# Patient Record
Sex: MALE | Race: WHITE | Employment: UNEMPLOYED | URBAN - METROPOLITAN AREA
[De-identification: names, ages, dates, MRNs, and addresses within clinical notes are randomized per-mention and may not be internally consistent; named-entity substitution may affect disease eponyms.]

---

## 2017-01-11 ENCOUNTER — TELEPHONE (OUTPATIENT)
Dept: FAMILY MEDICINE | Facility: OTHER | Age: 37
End: 2017-01-11

## 2017-01-11 NOTE — TELEPHONE ENCOUNTER
Summary:    Patient is due/failing the following:   ACT and BP CHECK    Action needed:   Patient needs to do ACT. and Patient needs follow up  appointment.    Type of outreach:    Phone, left message for patient to call back.     Questions for provider review:    None                                                                                                                                    Raissa Mcmanus       Chart routed to Care Team .        Panel Management Review      Patient has the following on his problem list:     Hypertension   Last three blood pressure readings:  BP Readings from Last 3 Encounters:   09/30/16 140/82   03/11/16 142/90   12/14/11 120/90     Blood pressure: Failed    HTN Guidelines:  Age 18-59 BP range:  Less than 140/90  Age 60-85 with Diabetes:  Less than 140/90  Age 60-85 without Diabetes:  less than 150/90      Composite cancer screening  Chart review shows that this patient is due/due soon for the following None

## 2017-01-11 NOTE — Clinical Note
[address]   [facility] [address]  Phone: [phone]  January 16, 2017      Davie Honeycutt  [address] [address] [place] [address] [facility] [address]      Dear Davie,    We care about your health and have reviewed your health plan including your medical conditions, medications, and lab results.  Based on this review, it is recommended that you follow up regarding the following health topic(s):  -Asthma  -High Blood Pressure    We recommend you take the following action(s):  -schedule a FOLLOWUP APPOINTMENT.   -Complete and return the attached ASTHMA CONTROL TEST.  If your total score is 19 or less or you have been to the ER or urgent care for your asthma, then please schedule an asthma followup appointment.     Please call us at the [facility] - [phone] (or use Att[employer] Live) to address the above recommendations.     Thank you for trusting [facility] and we appreciate the opportunity to serve you.  We look forward to supporting your healthcare needs in the future.    Healthy Regards,    Your Health Care Team  [facility]

## 2017-03-24 ENCOUNTER — OFFICE VISIT (OUTPATIENT)
Dept: UROLOGY | Facility: CLINIC | Age: 37
End: 2017-03-24
Payer: COMMERCIAL

## 2017-03-24 VITALS — DIASTOLIC BLOOD PRESSURE: 72 MMHG | HEART RATE: 80 BPM | SYSTOLIC BLOOD PRESSURE: 122 MMHG | RESPIRATION RATE: 14 BRPM

## 2017-03-24 DIAGNOSIS — Z30.09 STERILIZATION CONSULT: Primary | ICD-10-CM

## 2017-03-24 PROCEDURE — 99203 OFFICE O/P NEW LOW 30 MIN: CPT | Performed by: UROLOGY

## 2017-03-24 NOTE — NURSING NOTE
"Chief Complaint   Patient presents with     Consult       Initial /72 (BP Location: Right arm, Patient Position: Chair, Cuff Size: Adult Large)  Pulse 80  Resp 14 Estimated body mass index is 36.41 kg/(m^2) as calculated from the following:    Height as of 3/11/16: 1.88 m (6' 2\").    Weight as of 9/30/16: 128.6 kg (283 lb 9.6 oz).  Medication Reconciliation: complete   Susy Hoang RN       "

## 2017-03-24 NOTE — PATIENT INSTRUCTIONS
Your Vasectomy is scheduled 4/20/2017 at 1000 .  Please call [phone] if you need to reschedule this appointment or if you have any questions.     General Vasectomy Information    Vasectomy is a surgery.  If it is successful, it will be impossible for you to ever father children.  The following information regards the male sterilization done by an operation called a vasectomy.  This is done in the physician's office.    The operation done to sterilize the male is easier, safer and much less expensive than the operation done to sterilize the woman.    Sterilization should be considered permanent.  For most males, once the operation is done, it can never me undone.  There are attempts made occasionally to reconnect the tubes that have been cut during the procedure, but this is very difficult and expensive and works only about 50-70% of the time.  In order for any of the physicians in our clinic to do a vasectomy, we require that you consider this a permanent form a sterilization.    A vasectomy can be done at any time, but it is best to think about having it done when you can take at least one day off from work and any excess activities.    Your decision to have a vasectomy should only be made with the following facts clear in mind.    1. First, a vasectomy is only one of several means of birth control.  Many form of temporary contraception are available.  If you have any questions about other methods, please discuss this with your physician.    2. A vasectomy may be unsuccessful in approximately one out of 1000 couples per year.  This occurs when the tubes which are cut during the procedure reconnect themselves.  Sterility cannot be guaranteed.    3. You should be aware that it is the current belief of the medical profession that a vasectomy procedure does not alter a male physically, physiologically or sexually.  Because each person is a unique individual, there is always the possibility of an adverse  phychiatric reaction.  This can be best avoided by being very comfortable in your own mind that you want to have this done, and that you do not want to father any children in the future.  If this is not clear in your mind, this should be further discussed with your physician.    4. You will not notice a change in the volume of your ejaculate since sperm is a very small amount of the semen and it is only the sperm that is stopped from entering the ejaculate after a vasectomy.  Your prostate and seminal vesicle glands really supply most of the semen and this is not at all decreased after a vasectomy.  Also there is no effect on the male hormones.    5. You do not become sterile immediately following a vasectomy due to the fact that there is still sperm remaining in your system that must be eliminated by ejaculation.  For this reason, your sexual partner could still become pregnant for a period of time following the vasectomy operation.  It is necessary that contraceptive measures be used until you receive confirmation from your physician that you are sterile.  It takes approximately 12 ejaculations to clear the semen of sperm, but this can differ in different men.  For this reason, it is very important that your semen be checked for sperm before you are considered sterile, and this should be done approximately 12 weeks after your vasectomy.      6. Vasectomy has risks and benefits.  Among the risks are possible complications resulting from the procedure.  These risks include but are not limited to:   A.  Bleeding, infection, or hematoma occuring during or in the recovery period   from the procedure.   B.  Sperm granuloma or a small pea to walnut sized lump which is a collection of   scar tissue and sperm in your scrotal sack and remains permanently   C.  There may be an increased risk of prostate cancer although the data is   unclear.  Preparation for Vasectomy:  Shave the hair away from the base of your penis and  around your testicles.  Wear snug underwear the day of the vasectomy to support your testicles.  Do not take aspirin, ibuprofen, advil, motrin, aleve products one week prior to your vasectomy.  Arrange for a  if you take any medication for relaxation from the physician.

## 2017-03-24 NOTE — MR AVS SNAPSHOT
After Visit Summary   3/24/2017    Davie Honeycutt    MRN: [id number]           Patient Information     Date Of Birth          1980        Visit Information        Provider Department      3/24/2017 11:30 AM Mesfin Mccurdy MD [facility]     Sterilization consult    -  1      Care Instructions    Your Vasectomy is scheduled 4/20/2017 at 1000 .  Please call [phone] if you need to reschedule this appointment or if you have any questions.     General Vasectomy Information    Vasectomy is a surgery.  If it is successful, it will be impossible for you to ever father children.  The following information regards the male sterilization done by an operation called a vasectomy.  This is done in the physician's office.    The operation done to sterilize the male is easier, safer and much less expensive than the operation done to sterilize the woman.    Sterilization should be considered permanent.  For most males, once the operation is done, it can never me undone.  There are attempts made occasionally to reconnect the tubes that have been cut during the procedure, but this is very difficult and expensive and works only about 50-70% of the time.  In order for any of the physicians in our clinic to do a vasectomy, we require that you consider this a permanent form a sterilization.    A vasectomy can be done at any time, but it is best to think about having it done when you can take at least one day off from work and any excess activities.    Your decision to have a vasectomy should only be made with the following facts clear in mind.    1. First, a vasectomy is only one of several means of birth control.  Many form of temporary contraception are available.  If you have any questions about other methods, please discuss this with your physician.    2. A vasectomy may be unsuccessful in approximately one out of 1000 couples per year.  This occurs when the tubes  which are cut during the procedure reconnect themselves.  Sterility cannot be guaranteed.    3. You should be aware that it is the current belief of the medical profession that a vasectomy procedure does not alter a male physically, physiologically or sexually.  Because each person is a unique individual, there is always the possibility of an adverse phychiatric reaction.  This can be best avoided by being very comfortable in your own mind that you want to have this done, and that you do not want to father any children in the future.  If this is not clear in your mind, this should be further discussed with your physician.    4. You will not notice a change in the volume of your ejaculate since sperm is a very small amount of the semen and it is only the sperm that is stopped from entering the ejaculate after a vasectomy.  Your prostate and seminal vesicle glands really supply most of the semen and this is not at all decreased after a vasectomy.  Also there is no effect on the male hormones.    5. You do not become sterile immediately following a vasectomy due to the fact that there is still sperm remaining in your system that must be eliminated by ejaculation.  For this reason, your sexual partner could still become pregnant for a period of time following the vasectomy operation.  It is necessary that contraceptive measures be used until you receive confirmation from your physician that you are sterile.  It takes approximately 12 ejaculations to clear the semen of sperm, but this can differ in different men.  For this reason, it is very important that your semen be checked for sperm before you are considered sterile, and this should be done approximately 12 weeks after your vasectomy.      6. Vasectomy has risks and benefits.  Among the risks are possible complications resulting from the procedure.  These risks include but are not limited to:   A.  Bleeding, infection, or hematoma occuring during or in the recovery  "period   from the procedure.   B.  Sperm granuloma or a small pea to walnut sized lump which is a collection of   scar tissue and sperm in your scrotal sack and remains permanently   C.  There may be an increased risk of prostate cancer although the data is   unclear.  Preparation for Vasectomy:  Shave the hair away from the base of your penis and around your testicles.  Wear snug underwear the day of the vasectomy to support your testicles.  Do not take aspirin, ibuprofen, advil, motrin, aleve products one week prior to your vasectomy.  Arrange for a  if you take any medication for relaxation from the physician.            Follow-ups after your visit        Your next 10 appointments already scheduled     Apr 20, 2017 10:00 AM CDT   Return Visit with Mesfin Mccurdy MD   [facility]    [address] [place] [address] [phone]              Who to contact     If you have questions or need follow up information about today's clinic visit or your schedule please contact [facility] directly at [phone].  Normal or non-critical lab and imaging results will be communicated to you by Yarraahart, letter or phone within 4 business days after the clinic has received the results. If you do not hear from us within 7 days, please contact the clinic through Million-[employer] or phone. If you have a critical or abnormal lab result, we will notify you by phone as soon as possible.  Submit refill requests through [email or web address] or call your pharmacy and they will forward the refill request to us. Please allow 3 business days for your refill to be completed.          Additional Information About Your Visit        [employer] Information     [email or web address] lets you send messages to your doctor, view your test results, renew your prescriptions, schedule appointments and more. To sign up, go to [email or web address] . Click on \"Log in\" on the left side of the screen, which will " "take you to the Welcome page. Then click on \"Sign up Now\" on the right side of the page.     You will be asked to enter the access code listed below, as well as some personal information. Please follow the directions to create your username and password.     Your access code is: [email or web address]  Expires: 2017  4:10 PM     Your access code will  in 90 days. If you need help or a new code, please call your [place] clinic or [phone].        Care EveryWhere ID     This is your Care EveryWhere ID. This could be used by other organizations to access your [place] medical records  [id number]        Your Vitals Were     Pulse Respirations                80 14           Blood Pressure from Last 3 Encounters:   17 122/72   16 140/82   16 142/90    Weight from Last 3 Encounters:   16 128.6 kg (283 lb 9.6 oz)   16 125.8 kg (277 lb 6.4 oz)   11 108.9 kg (240 lb)              Today, you had the following     No orders found for display       Primary Care Provider Office Phone # Fax #    Jarod Navarrete PA-C [phone]       [address]  [facility] [address]        Thank you!     Thank you for choosing [facility]  for your care. Our goal is always to provide you with excellent care. Hearing back from our patients is one way we can continue to improve our services. Please take a few minutes to complete the written survey that you may receive in the mail after your visit with us. Thank you!             Your Updated Medication List - Protect others around you: Learn how to safely use, store and throw away your medicines at www.disposemymeds.org.      Notice  As of 3/24/2017 11:42 AM    You have not been prescribed any medications.      "

## 2017-04-17 ENCOUNTER — TELEPHONE (OUTPATIENT)
Dept: UROLOGY | Facility: CLINIC | Age: 37
End: 2017-04-17

## 2017-04-17 NOTE — TELEPHONE ENCOUNTER
Reason for Call:  Other call back    Detailed comments:  Please call to clarify if the scheduled procedure(vesectomy) will be in the office or will take place at an hospital ? Please call to discuss further    Phone Number Patient can be reached at: Cell number on file:    No relevant phone numbers on file.       Best Time: today    Can we leave a detailed message on this number? YES    Call taken on 4/17/2017 at 11:41 AM by Moira Redding

## 2017-04-20 ENCOUNTER — OFFICE VISIT (OUTPATIENT)
Dept: UROLOGY | Facility: OTHER | Age: 37
End: 2017-04-20
Payer: COMMERCIAL

## 2017-04-20 VITALS — DIASTOLIC BLOOD PRESSURE: 122 MMHG | SYSTOLIC BLOOD PRESSURE: 162 MMHG | OXYGEN SATURATION: 100 % | HEART RATE: 88 BPM

## 2017-04-20 DIAGNOSIS — I10 ESSENTIAL HYPERTENSION: ICD-10-CM

## 2017-04-20 DIAGNOSIS — Z30.2 ENCOUNTER FOR VASECTOMY: Primary | ICD-10-CM

## 2017-04-20 PROCEDURE — 55250 REMOVAL OF SPERM DUCT(S): CPT | Performed by: UROLOGY

## 2017-04-20 PROCEDURE — 88302 TISSUE EXAM BY PATHOLOGIST: CPT | Performed by: UROLOGY

## 2017-04-20 PROCEDURE — 99000 SPECIMEN HANDLING OFFICE-LAB: CPT | Performed by: UROLOGY

## 2017-04-20 NOTE — PROGRESS NOTES
Patient returns to clinic for vasectomy.  After informed consent has been obtained, he was placed supine in the OR table.  He was draped and prepped in usual surgical fashion.  2% lidocaine used for local anesthetics.  A scalpel less technique was used.  A small nick was made in the skin after vas identified/clamped.  Surrounding tissue was  from vas.  A small portion of vas was excised.  Lumen of vas burned.  Vas tied with silk sutures after proximal portion closed.  3.0 chromic suture to close skin opening.  This was again repeated on the other side.  Patient tolerated the procedure well.  Post vas instructions given to patient today.    HTN: Patient to follow up with Primary Care provider regarding elevated blood pressure.

## 2017-04-20 NOTE — MR AVS SNAPSHOT
"              After Visit Summary   4/20/2017    Davie Honeycutt    MRN: [id number]           Patient Information     Date Of Birth          1980        Visit Information        Provider Department      4/20/2017 10:00 AM Mesfin Mccurdy MD [facility]        Today's Diagnoses     Encounter for vasectomy    -  1    Essential hypertension           Follow-ups after your visit        Future tests that were ordered for you today     Open Standing Orders        Priority Remaining Interval Expires Ordered    Semen Post Vasectomy Qual ([place], [place], [place]) Routine 2/2 prn 7/20/2017 4/20/2017            Who to contact     If you have questions or need follow up information about today's clinic visit or your schedule please contact [facility] directly at [phone].  Normal or non-critical lab and imaging results will be communicated to you by MyChart, letter or phone within 4 business days after the clinic has received the results. If you do not hear from us within 7 days, please contact the clinic through MyChart or phone. If you have a critical or abnormal lab result, we will notify you by phone as soon as possible.  Submit refill requests through [employer] or call your pharmacy and they will forward the refill request to us. Please allow 3 business days for your refill to be completed.          Additional Information About Your Visit        MyChart Information     [employer] lets you send messages to your doctor, view your test results, renew your prescriptions, schedule appointments and more. To sign up, go to [email or web address] . Click on \"Log in\" on the left side of the screen, which will take you to the Welcome page. Then click on \"Sign up Now\" on the right side of the page.     You will be asked to enter the access code listed below, as well as some personal information. Please follow the directions to create your username and password.     Your access code is: " [email or web address]  Expires: 2017  4:10 PM     Your access code will  in 90 days. If you need help or a new code, please call your [place] clinic or [phone].        Care EveryWhere ID     This is your Care EveryWhere ID. This could be used by other organizations to access your [place] medical records  [id number]        Your Vitals Were     Pulse Pulse Oximetry                88 100%           Blood Pressure from Last 3 Encounters:   17 (!) 162/122   17 122/72   16 140/82    Weight from Last 3 Encounters:   16 128.6 kg (283 lb 9.6 oz)   16 125.8 kg (277 lb 6.4 oz)   11 108.9 kg (240 lb)              We Performed the Following     CL SPECIMEN HANDLING, OFF->LAB     Surgical pathology exam     VASECTOMY UNILAT/BILAT W POSTOP SEMEN        Primary Care Provider Office Phone # Fax #    Jarod Navarrete PA-C [phone]       [facility] [address]  [facility] [address]        Thank you!     Thank you for choosing [facility]  for your care. Our goal is always to provide you with excellent care. Hearing back from our patients is one way we can continue to improve our services. Please take a few minutes to complete the written survey that you may receive in the mail after your visit with us. Thank you!             Your Updated Medication List - Protect others around you: Learn how to safely use, store and throw away your medicines at www.disposemymeds.org.      Notice  As of 2017 10:48 AM    You have not been prescribed any medications.

## 2017-04-20 NOTE — NURSING NOTE
"Chief Complaint   Patient presents with     Vasectomy       Initial BP (!) 162/122 (BP Location: Left arm, Patient Position: Chair, Cuff Size: Adult Large)  Pulse 88  SpO2 100% Estimated body mass index is 36.41 kg/(m^2) as calculated from the following:    Height as of 3/11/16: 6' 2\" (1.88 m).    Weight as of 9/30/16: 283 lb 9.6 oz (128.6 kg).  Medication Reconciliation: complete     Justine Cassidy CMA (AAMA)      "

## 2017-04-24 LAB — COPATH REPORT: NORMAL

## 2017-04-27 ENCOUNTER — TELEPHONE (OUTPATIENT)
Dept: FAMILY MEDICINE | Facility: OTHER | Age: 37
End: 2017-04-27

## 2017-04-27 NOTE — LETTER
[address]  [facility] [address]  Phone: [phone]  May 1, 2017      Dvaie Honeycutt  [address] [address] [place] [address] [place]  [place] [address]      Dear Davie,    We care about your health and have reviewed your health plan including your medical conditions, medications, and lab results.  Based on this review, it is recommended that you follow up regarding the following health topic(s):  -High Blood Pressure    We recommend you take the following action(s):  -schedule a FOLLOWUP APPOINTMENT.     Please call us at the [facility] - [phone] (or use StoredIQ) to address the above recommendations.     Thank you for trusting [facility] and we appreciate the opportunity to serve you.  We look forward to supporting your healthcare needs in the future.    Healthy Regards,    Your Health Care Team  [facility]

## 2017-04-27 NOTE — TELEPHONE ENCOUNTER
Summary:    Patient is due/failing the following:   BP CHECK and FOLLOW UP    Action needed:   Patient needs office visit for hypertension follow up    Type of outreach:    Phone, left message for patient to call back.     Questions for provider review:    None                                                                                                                                    Raissa Emerynaima       Chart routed to Care Team .      Panel Management Review      Patient has the following on his problem list:     Asthma review     ACT Total Scores 3/11/2016   ACT TOTAL SCORE -   ASTHMA ER VISITS -   ASTHMA HOSPITALIZATIONS -   ACT TOTAL SCORE (Goal Greater than or Equal to 20) 22   In the past 12 months, how many times did you visit the emergency room for your asthma without being admitted to the hospital? 0   In the past 12 months, how many times were you hospitalized overnight because of your asthma? 0      1. Is Asthma diagnosis on the Problem List? Yes    2. Is Asthma listed on Health Maintenance? Yes    3. Patient is due for:  none    Hypertension   Last three blood pressure readings:  BP Readings from Last 3 Encounters:   04/20/17 (!) 162/122   03/24/17 122/72   09/30/16 140/82     Blood pressure: FAILED    HTN Guidelines:  Age 18-59 BP range:  Less than 140/90  Age 60-85 with Diabetes:  Less than 140/90  Age 60-85 without Diabetes:  less than 150/90      Composite cancer screening  Chart review shows that this patient is due/due soon for the following None

## 2017-08-09 ENCOUNTER — TELEPHONE (OUTPATIENT)
Dept: FAMILY MEDICINE | Facility: OTHER | Age: 37
End: 2017-08-09

## 2017-08-09 NOTE — TELEPHONE ENCOUNTER
Summary:    Patient is due/failing the following:   ACT, BP CHECK and FOLLOW UP    Action needed:   Patient needs office visit for follow up and BP check . and Patient needs to do ACT.    Type of outreach:    Phone, left message for patient to call back.     Questions for provider review:    None                                                                                                                                    Raissa Mcmanus       Chart routed to Care Team .    Panel Management Review      Patient has the following on his problem list:     Asthma review     ACT Total Scores 3/11/2016   ACT TOTAL SCORE -   ASTHMA ER VISITS -   ASTHMA HOSPITALIZATIONS -   ACT TOTAL SCORE (Goal Greater than or Equal to 20) 22   In the past 12 months, how many times did you visit the emergency room for your asthma without being admitted to the hospital? 0   In the past 12 months, how many times were you hospitalized overnight because of your asthma? 0      1. Is Asthma diagnosis on the Problem List? Yes    2. Is Asthma listed on Health Maintenance? Yes    3. Patient is due for:  ACT    Hypertension   Last three blood pressure readings:  BP Readings from Last 3 Encounters:   04/20/17 (!) 162/122   03/24/17 122/72   09/30/16 140/82     Blood pressure: FAILED    HTN Guidelines:  Age 18-59 BP range:  Less than 140/90  Age 60-85 with Diabetes:  Less than 140/90  Age 60-85 without Diabetes:  less than 150/90          Composite cancer screening  Chart review shows that this patient is due/due soon for the following None

## 2017-08-09 NOTE — LETTER
[address]   [facility] [address]  Phone: [phone]  August 28, 2017      Davie Honeycutt  [address] [facility] [address]      Dear Davie,    We care about your health and have reviewed your health plan including your medical conditions, medications, and lab results.  Based on this review, it is recommended that you follow up regarding the following health topic(s):  -Asthma  -High Blood Pressure    We recommend you take the following action(s):  -schedule a FOLLOWUP APPOINTMENT.   -Complete and return the attached ASTHMA CONTROL TEST.  If your total score is 19 or less or you have been to the ER or urgent care for your asthma, then please schedule an asthma followup appointment.     Please call us at the [facility] - [phone] (or use [employer]Fusion) to address the above recommendations.     Thank you for trusting [facility] and we appreciate the opportunity to serve you.  We look forward to supporting your healthcare needs in the future.    Healthy Regards,    Your Health Care Team  [facility]

## 2017-08-11 DIAGNOSIS — Z30.2 ENCOUNTER FOR VASECTOMY: ICD-10-CM

## 2017-08-11 LAB — SPERM P VAS SMN QL MICRO: NORMAL

## 2017-08-11 PROCEDURE — 89321 SEMEN ANAL SPERM DETECTION: CPT | Performed by: UROLOGY

## 2017-08-14 ENCOUNTER — TELEPHONE (OUTPATIENT)
Dept: FAMILY MEDICINE | Facility: OTHER | Age: 37
End: 2017-08-14

## 2017-08-14 NOTE — TELEPHONE ENCOUNTER
Patient called for lab results.    No Sperm Seen.  Patient verbalized understanding.  Sylvia Tellez RN

## 2020-11-28 ENCOUNTER — APPOINTMENT (OUTPATIENT)
Dept: CT IMAGING | Facility: CLINIC | Age: 40
End: 2020-11-28
Attending: PHYSICIAN ASSISTANT
Payer: COMMERCIAL

## 2020-11-28 ENCOUNTER — HOSPITAL ENCOUNTER (EMERGENCY)
Facility: CLINIC | Age: 40
Discharge: HOME | End: 2020-11-28
Attending: PHYSICIAN ASSISTANT | Admitting: PHYSICIAN ASSISTANT
Payer: COMMERCIAL

## 2020-11-28 VITALS
HEIGHT: 75 IN | BODY MASS INDEX: 37.3 KG/M2 | OXYGEN SATURATION: 98 % | WEIGHT: 300 LBS | TEMPERATURE: 98.8 F | RESPIRATION RATE: 18 BRPM | DIASTOLIC BLOOD PRESSURE: 107 MMHG | SYSTOLIC BLOOD PRESSURE: 172 MMHG | HEART RATE: 89 BPM

## 2020-11-28 DIAGNOSIS — K85.90 ACUTE PANCREATITIS: ICD-10-CM

## 2020-11-28 DIAGNOSIS — R03.0 ELEVATED BLOOD PRESSURE READING WITHOUT DIAGNOSIS OF HYPERTENSION: ICD-10-CM

## 2020-11-28 LAB
ALBUMIN SERPL-MCNC: 4 G/DL (ref 3.4–5)
ALP SERPL-CCNC: 92 U/L (ref 40–150)
ALT SERPL W P-5'-P-CCNC: 51 U/L (ref 0–70)
ANION GAP SERPL CALCULATED.3IONS-SCNC: 7 MMOL/L (ref 3–14)
AST SERPL W P-5'-P-CCNC: 18 U/L (ref 0–45)
BASOPHILS # BLD AUTO: 0 10E9/L (ref 0–0.2)
BASOPHILS NFR BLD AUTO: 0.2 %
BILIRUB DIRECT SERPL-MCNC: 0.2 MG/DL (ref 0–0.2)
BILIRUB SERPL-MCNC: 0.8 MG/DL (ref 0.2–1.3)
BUN SERPL-MCNC: 8 MG/DL (ref 7–30)
CALCIUM SERPL-MCNC: 9.5 MG/DL (ref 8.5–10.1)
CHLORIDE SERPL-SCNC: 106 MMOL/L (ref 94–109)
CO2 SERPL-SCNC: 25 MMOL/L (ref 20–32)
CREAT SERPL-MCNC: 0.82 MG/DL (ref 0.66–1.25)
DIFFERENTIAL METHOD BLD: ABNORMAL
EOSINOPHIL NFR BLD AUTO: 0.5 %
ERYTHROCYTE [DISTWIDTH] IN BLOOD BY AUTOMATED COUNT: 13.3 % (ref 10–15)
GFR SERPL CREATININE-BSD FRML MDRD: >90 ML/MIN/{1.73_M2}
GLUCOSE SERPL-MCNC: 134 MG/DL (ref 70–99)
HCT VFR BLD AUTO: 44.8 % (ref 40–53)
HGB BLD-MCNC: 14.8 G/DL (ref 13.3–17.7)
IMM GRANULOCYTES # BLD: 0.1 10E9/L (ref 0–0.4)
IMM GRANULOCYTES NFR BLD: 0.4 %
LIPASE SERPL-CCNC: 375 U/L (ref 73–393)
LYMPHOCYTES # BLD AUTO: 1 10E9/L (ref 0.8–5.3)
LYMPHOCYTES NFR BLD AUTO: 7.5 %
MCH RBC QN AUTO: 29 PG (ref 26.5–33)
MCHC RBC AUTO-ENTMCNC: 33 G/DL (ref 31.5–36.5)
MCV RBC AUTO: 88 FL (ref 78–100)
MONOCYTES # BLD AUTO: 1 10E9/L (ref 0–1.3)
MONOCYTES NFR BLD AUTO: 7.8 %
NEUTROPHILS # BLD AUTO: 10.8 10E9/L (ref 1.6–8.3)
NEUTROPHILS NFR BLD AUTO: 83.6 %
NRBC # BLD AUTO: 0 10*3/UL
NRBC BLD AUTO-RTO: 0 /100
PLATELET # BLD AUTO: 169 10E9/L (ref 150–450)
POTASSIUM SERPL-SCNC: 3.8 MMOL/L (ref 3.4–5.3)
PROT SERPL-MCNC: 8.4 G/DL (ref 6.8–8.8)
RBC # BLD AUTO: 5.1 10E12/L (ref 4.4–5.9)
SODIUM SERPL-SCNC: 138 MMOL/L (ref 133–144)
TROPONIN I SERPL-MCNC: <0.015 UG/L (ref 0–0.04)
WBC # BLD AUTO: 12.9 10E9/L (ref 4–11)

## 2020-11-28 PROCEDURE — 93005 ELECTROCARDIOGRAM TRACING: CPT | Performed by: PHYSICIAN ASSISTANT

## 2020-11-28 PROCEDURE — 84484 ASSAY OF TROPONIN QUANT: CPT | Performed by: PHYSICIAN ASSISTANT

## 2020-11-28 PROCEDURE — 250N000011 HC RX IP 250 OP 636: Performed by: PHYSICIAN ASSISTANT

## 2020-11-28 PROCEDURE — 80048 BASIC METABOLIC PNL TOTAL CA: CPT | Performed by: PHYSICIAN ASSISTANT

## 2020-11-28 PROCEDURE — 93010 ELECTROCARDIOGRAM REPORT: CPT | Performed by: PHYSICIAN ASSISTANT

## 2020-11-28 PROCEDURE — 83690 ASSAY OF LIPASE: CPT | Performed by: PHYSICIAN ASSISTANT

## 2020-11-28 PROCEDURE — 85025 COMPLETE CBC W/AUTO DIFF WBC: CPT | Performed by: PHYSICIAN ASSISTANT

## 2020-11-28 PROCEDURE — 80076 HEPATIC FUNCTION PANEL: CPT | Performed by: PHYSICIAN ASSISTANT

## 2020-11-28 PROCEDURE — 74177 CT ABD & PELVIS W/CONTRAST: CPT

## 2020-11-28 PROCEDURE — 250N000009 HC RX 250: Performed by: PHYSICIAN ASSISTANT

## 2020-11-28 PROCEDURE — 72131 CT LUMBAR SPINE W/O DYE: CPT

## 2020-11-28 PROCEDURE — 99285 EMERGENCY DEPT VISIT HI MDM: CPT | Mod: 25 | Performed by: PHYSICIAN ASSISTANT

## 2020-11-28 PROCEDURE — 96374 THER/PROPH/DIAG INJ IV PUSH: CPT | Mod: 59 | Performed by: PHYSICIAN ASSISTANT

## 2020-11-28 RX ORDER — IOPAMIDOL 755 MG/ML
500 INJECTION, SOLUTION INTRAVASCULAR ONCE
Status: COMPLETED | OUTPATIENT
Start: 2020-11-28 | End: 2020-11-28

## 2020-11-28 RX ORDER — ONDANSETRON 4 MG/1
4 TABLET, ORALLY DISINTEGRATING ORAL EVERY 8 HOURS PRN
Qty: 10 TABLET | Refills: 0 | Status: SHIPPED | OUTPATIENT
Start: 2020-11-28 | End: 2020-11-30

## 2020-11-28 RX ORDER — KETOROLAC TROMETHAMINE 30 MG/ML
30 INJECTION, SOLUTION INTRAMUSCULAR; INTRAVENOUS ONCE
Status: COMPLETED | OUTPATIENT
Start: 2020-11-28 | End: 2020-11-28

## 2020-11-28 RX ORDER — OXYCODONE AND ACETAMINOPHEN 5; 325 MG/1; MG/1
1-2 TABLET ORAL EVERY 6 HOURS PRN
Qty: 12 TABLET | Refills: 0 | Status: SHIPPED | OUTPATIENT
Start: 2020-11-28 | End: 2020-11-30

## 2020-11-28 RX ADMIN — SODIUM CHLORIDE 80 ML: 9 INJECTION, SOLUTION INTRAVENOUS at 13:59

## 2020-11-28 RX ADMIN — IOPAMIDOL 95 ML: 755 INJECTION, SOLUTION INTRAVENOUS at 13:59

## 2020-11-28 RX ADMIN — KETOROLAC TROMETHAMINE 30 MG: 30 INJECTION, SOLUTION INTRAMUSCULAR at 14:17

## 2020-11-28 ASSESSMENT — MIFFLIN-ST. JEOR: SCORE: 2356.42

## 2020-11-28 NOTE — ED PROVIDER NOTES
"  History     Chief Complaint   Patient presents with     Back Pain     HPI  Davie Honeycutt is a 40 year old male who presents to the emergency department complaining of back pain. The patient reports yesterday he developed a burning type pain in the center of his mid back.  Pain has been constant since onset and kept him awake last night.  He has been taking Tylenol and ibuprofen for the pain without relief, most recently Tylenol at 5 AM this morning.  He also kept a heating pad on it overnight without change.  Pain radiated around both sides of his back to his abdomen at one point.  He states he has had a pulling type back pain in the past that went away within a few days but nothing like this.  Denies movement making pain worse. He denies any abdominal pain but feels like there is \"pressure\" in his abdomen.  He had diarrhea last night but no BM this morning.  He states he vomited this morning while brushing his teeth but admits he has easy gag reflex with brushing teeth.  No persistent nausea or vomiting.  He denies any chest pain, tightness, shortness of breath, lightheadedness, diaphoresis.  Denies headache, blurred vision.  No numbness, pain, or weakness in legs.  No saddle paresthesias or loss of bowel/bladder control.  No fevers, body aches, cough symptoms.  He did go to the urgent care today for his symptoms and was referred to the ER due to his blood pressure and having a fever of \"99.3 F\" by ear thermometer.  Denies any burning with urination, blood in the urine, or urinary urgency.  He has been drinking a lot more water so has been urinating more than usual.      Allergies:  No Known Allergies    Problem List:    Patient Active Problem List    Diagnosis Date Noted     HTN (hypertension) 08/26/2011     Priority: Medium     Intermittent asthma 08/26/2011     Priority: Medium     Obesity 08/26/2011     Priority: Medium     CARDIOVASCULAR SCREENING; LDL GOAL LESS THAN 160 10/31/2010     Priority: Medium " "       Past Medical History:    Past Medical History:   Diagnosis Date     Hypertension        Past Surgical History:    No past surgical history on file.    Family History:    Family History   Problem Relation Age of Onset     Cerebrovascular Disease Maternal Grandfather      Genitourinary Problems Mother      Cancer Father         skin     C.A.D. Paternal Uncle      Hypertension Paternal Uncle      Hypertension Maternal Aunt      Emphysema Maternal Grandmother      Coronary Artery Disease Paternal Grandmother 60        triple bypass,  at 84     Coronary Artery Disease Paternal Grandfather 50        had triple bypass,  at 82       Social History:  Marital Status:   [2]  Social History     Tobacco Use     Smoking status: Former Smoker     Smokeless tobacco: Current User     Types: Chew   Substance Use Topics     Alcohol use: Yes     Comment: weekends/social, 6-8 beers per night on weekends     Drug use: No        Medications:         ondansetron (ZOFRAN ODT) 4 MG ODT tab       oxyCODONE-acetaminophen (PERCOCET) 5-325 MG tablet          Review of Systems   All other systems reviewed and are negative.      Physical Exam   BP: (!) 207/119  Pulse: 91  Temp: 98.8  F (37.1  C)  Resp: 19  Height: 190.5 cm (6' 3\")  Weight: 136.1 kg (300 lb)  SpO2: 98 %      Physical Exam  Vitals signs and nursing note reviewed.   Constitutional:       General: He is not in acute distress.     Appearance: Normal appearance. He is obese. He is not ill-appearing, toxic-appearing or diaphoretic.   HENT:      Head: Normocephalic and atraumatic.      Nose: Nose normal.   Eyes:      Extraocular Movements: Extraocular movements intact.      Conjunctiva/sclera: Conjunctivae normal.      Pupils: Pupils are equal, round, and reactive to light.   Neck:      Musculoskeletal: Neck supple. No muscular tenderness.   Cardiovascular:      Rate and Rhythm: Normal rate and regular rhythm.      Pulses: Normal pulses.      Heart sounds: " Normal heart sounds.   Pulmonary:      Effort: Pulmonary effort is normal. No respiratory distress.      Breath sounds: Normal breath sounds.   Abdominal:      General: There is distension (mild).      Tenderness: There is no abdominal tenderness. There is no right CVA tenderness or left CVA tenderness.   Musculoskeletal:      Cervical back: Normal.      Thoracic back: He exhibits pain (burning pain localized to area noted but no tenderness elicited with palpation, nover overlying rash). He exhibits normal range of motion, no tenderness and no bony tenderness.      Lumbar back: Normal. He exhibits normal range of motion, no tenderness and no bony tenderness.        Back:       Comments: BLE with no edema, distal pulses palpable   Skin:     General: Skin is warm and dry.   Neurological:      General: No focal deficit present.      Mental Status: He is alert and oriented to person, place, and time. Mental status is at baseline.   Psychiatric:         Mood and Affect: Mood normal.         Behavior: Behavior normal.         ED Course        Procedures               EKG Interpretation:      Interpreted by Pallavi Hinton PA-C  Time reviewed: 1325  Symptoms at time of EKG: back pain   Rhythm: normal sinus   Rate: Normal  Axis: Normal  Ectopy: premature atrial contraction  Conduction: normal  ST Segments/ T Waves: No acute ischemic changes  Q Waves: none  Comparison to prior: frequent PACs, otherwise no acute changes    Clinical Impression: sinus rhythm, no acute ischemia, PACs        Results for orders placed or performed during the hospital encounter of 11/28/20 (from the past 24 hour(s))   CBC with platelets differential   Result Value Ref Range    WBC 12.9 (H) 4.0 - 11.0 10e9/L    RBC Count 5.10 4.4 - 5.9 10e12/L    Hemoglobin 14.8 13.3 - 17.7 g/dL    Hematocrit 44.8 40.0 - 53.0 %    MCV 88 78 - 100 fl    MCH 29.0 26.5 - 33.0 pg    MCHC 33.0 31.5 - 36.5 g/dL    RDW 13.3 10.0 - 15.0 %    Platelet Count 169 150 - 450  10e9/L    Diff Method Automated Method     % Neutrophils 83.6 %    % Lymphocytes 7.5 %    % Monocytes 7.8 %    % Eosinophils 0.5 %    % Basophils 0.2 %    % Immature Granulocytes 0.4 %    Nucleated RBCs 0 0 /100    Absolute Neutrophil 10.8 (H) 1.6 - 8.3 10e9/L    Absolute Lymphocytes 1.0 0.8 - 5.3 10e9/L    Absolute Monocytes 1.0 0.0 - 1.3 10e9/L    Absolute Basophils 0.0 0.0 - 0.2 10e9/L    Abs Immature Granulocytes 0.1 0 - 0.4 10e9/L    Absolute Nucleated RBC 0.0    Basic metabolic panel   Result Value Ref Range    Sodium 138 133 - 144 mmol/L    Potassium 3.8 3.4 - 5.3 mmol/L    Chloride 106 94 - 109 mmol/L    Carbon Dioxide 25 20 - 32 mmol/L    Anion Gap 7 3 - 14 mmol/L    Glucose 134 (H) 70 - 99 mg/dL    Urea Nitrogen 8 7 - 30 mg/dL    Creatinine 0.82 0.66 - 1.25 mg/dL    GFR Estimate >90 >60 mL/min/[1.73_m2]    GFR Estimate If Black >90 >60 mL/min/[1.73_m2]    Calcium 9.5 8.5 - 10.1 mg/dL   Troponin I   Result Value Ref Range    Troponin I ES <0.015 0.000 - 0.045 ug/L   Hepatic panel   Result Value Ref Range    Bilirubin Direct 0.2 0.0 - 0.2 mg/dL    Bilirubin Total 0.8 0.2 - 1.3 mg/dL    Albumin 4.0 3.4 - 5.0 g/dL    Protein Total 8.4 6.8 - 8.8 g/dL    Alkaline Phosphatase 92 40 - 150 U/L    ALT 51 0 - 70 U/L    AST 18 0 - 45 U/L   Lipase   Result Value Ref Range    Lipase 375 73 - 393 U/L   Lumbar spine CT w/o contrast    Narrative    CT LUMBAR SPINE WITHOUT CONTRAST  11/28/2020 2:15 PM     HISTORY: upper lumbar/lower thoracic back pain     TECHNIQUE: Axial images of the lumbar spine were obtained without  intravenous contrast. Multiplanar reformations were performed.   Radiation dose for this scan was reduced using automated exposure  control, adjustment of the mA and/or kV according to patient size, or  iterative reconstruction technique.    COMPARISON: None.    FINDINGS:    No evidence of acute fracture or posttraumatic subluxation. Multilevel  mild disc height loss is present most conspicuous at L5-S1.  No  high-grade spinal canal stenosis. Mild spinal canal stenosis is  present at L4-5 related to disc bulge. Mild foraminal stenosis is  present bilaterally and L5-S1. Paraspinal soft tissues are  unremarkable. Mild degenerative changes present bilateral sacroiliac  joints.      Impression    IMPRESSION:    1. No acute osseous abnormality.  2. Mild degenerative change.       HEATHER WASHBURN MD   CT Aortic Survey w Contrast    Narrative    CT AORTIC SURVEY WITH CONTRAST November 28, 2020 2:19 PM     HISTORY: Lower thoracic burning back pain, elevated blood pressure,  concern for possible dissection. Evaluate T/L spine as well.    TECHNIQUE: Initial noncontrast images are performed through the chest.  Follow-up thin section axial images are performed with additional  coronal and sagittal reformatted images. 95 mL of Isovue 370 are given  intravenously. Radiation dose for this scan was reduced using  automated exposure control, adjustment of the mA and/or kV according  to patient size, or iterative reconstruction technique.    FINDINGS:     Chest: Right lower lobe calcified granuloma is present. Lungs are  otherwise clear. No infiltrate or consolidation. No pleural effusion.  Heart is normal in size. Esophagus is unremarkable. No enlarged lymph  nodes. Calcified right hilar lymph node is consistent with old  granulomatous disease. Thyroid gland appears normal in size.    Aorta: Initial noncontrast images show no evidence of thoracic aortic  intramural hematoma. No mediastinal hemorrhage. Thoracic and abdominal  aorta are normal. No significant plaque or calcification. Branch  vessels are patent.    Abdomen: Inflammation noted around the pancreatic head consistent with  acute pancreatitis in the correct clinical setting. Probable fatty  infiltration of the liver. Liver, spleen, gallbladder, adrenal glands  and kidneys are otherwise unremarkable. No hydronephrosis. No enlarged  lymph nodes. No bowel obstruction,  diverticulitis or appendicitis.  Small peripancreatic lymph nodes are present. None are enlarged by CT  criteria.    Pelvis: The bladder, prostate and rectum are unremarkable. No enlarged  pelvic lymph nodes or free fluid. Bone window examination is  unremarkable. No destructive bone lesions. Please see concurrently  performed lumbar spine CT report for further details.      Impression    IMPRESSION:  1. Acute pancreatitis. No associated peripancreatic fluid collections.  No calcified gallstones are evident. Correlate with patient's  pancreatic enzymes.  2. Fatty infiltration of the liver. Abdominal and pelvic organs are  otherwise within normal limits.  3. Evidence of old granulomatous disease. Lungs are otherwise clear.  No enlarged lymph nodes.       JACK BOONE MD       Medications   ketorolac (TORADOL) injection 30 mg (30 mg Intravenous Given 11/28/20 1417)   sodium chloride (PF) 0.9% PF flush 3 mL (3 mLs Intravenous Given 11/28/20 1401)   Saline Bag 100mL  CT  flush use only (80 mLs Intravenous Given 11/28/20 1359)   iopamidol (ISOVUE-370) solution 500 mL (95 mLs Intravenous Given 11/28/20 1359)          Assessments & Plan (with Medical Decision Making)  Davie Honeycutt is a 40 year old who presented to the ED with a 24-hour history of mid back pain described as a burning sensation.  Radiated to bilateral aspects of abdomen yesterday and had one episode of vomiting today but denies fevers or persistent nausea/vomiting or abdominal pain otherwise.  No warning neurological symptoms in lower extremities.  His blood pressure on arrival was quite elevated at 207/119.  He was afebrile however with otherwise normal vital signs.  No acute abnormalities found on exam, he had no tenderness to the back or abdomen.  He had no warning neurological symptoms to suggest blood pressure affecting heart or brain however we did obtain an EKG to evaluate for effects of blood pressure.  EKG showed frequent PACs but otherwise was  normal.  His troponin was also undetectable.  Other lab studies demonstrated white count of 12.9 but otherwise unremarkable.  One concern was potential aortic dissection given blood pressure and vague back pain complaint.  CT scan aortic survey was obtained and was negative for dissection however did show findings consistent with acute pancreatitis.  Curiously his lipase and LFTs were normal but clinically pancreatitis would fit his symptomology.  Lumbar CT was negative.  He was given Toradol here with improvement of pain.  I discussed these results with the patient.  Since he is not having active nausea or vomiting and symptoms are improved I think this case of pancreatitis can be managed in the outpatient setting.  He will be prescribed Percocet and Zofran and advised clear liquid diet for the next 1 to 2 days and advance slowly as tolerated.  In regard to his blood pressure, it did improve slightly during course of ED stay but he will require close follow-up for recheck and likely starting antihypertensive medications.  He admits that he should be on blood pressure medications but has never actually started any.  He was advised to contact the clinic to schedule an appointment for Monday or Tuesday for reassessment regarding his hypertension and pancreatitis.  He was given instructions on when to return to the ED.  All questions answered and patient discharged home in suitable condition.     I have reviewed the nursing notes.    I have reviewed the findings, diagnosis, plan and need for follow up with the patient.    New Prescriptions    ONDANSETRON (ZOFRAN ODT) 4 MG ODT TAB    Take 1 tablet (4 mg) by mouth every 8 hours as needed    OXYCODONE-ACETAMINOPHEN (PERCOCET) 5-325 MG TABLET    Take 1-2 tablets by mouth every 6 hours as needed for pain       Final diagnoses:   Acute pancreatitis   Elevated blood pressure reading without diagnosis of hypertension     Note: Chart documentation done in part with Dragon Voice  Recognition software. Although reviewed after completion, some word and grammatical errors may remain.     11/28/2020   [facility] EMERGENCY DEPT     Pallavi Hinton PA-C  11/28/20 3536

## 2020-11-28 NOTE — ED AVS SNAPSHOT
[facility]  [address] [place] [address]  Phone: [phone]  Fax: [phone]                                    Davie Honeycutt   MRN: [id number]    Department: [facility]   Date of Visit: 11/28/2020           After Visit Summary Signature Page    I have received my discharge instructions, and my questions have been answered. I have discussed any challenges I see with this plan with the nurse or doctor.    ..........................................................................................................................................  Patient/Patient Representative Signature      ..........................................................................................................................................  Patient Representative Print Name and Relationship to Patient    ..................................................               ................................................  Date                                   Time    ..........................................................................................................................................  Reviewed by Signature/Title    ...................................................              ..............................................  Date                                               Time          22EPIC Rev 08/18

## 2020-11-28 NOTE — DISCHARGE INSTRUCTIONS
Your EKG and blood work was all reassuring.  Your CT scan showed that you have acute pancreatitis.  Your enzymes were normal so appears to be of mild case.  Please stick to a clear liquid diet for the next 1 to 2 days.  Use the Percocet prescribed to control pain and the Zofran prescribed for nausea.  Follow-up Monday or Tuesday for reassessment in regard to your pancreatitis and elevated blood pressure here in the emergency department.  If you develop any worsening symptoms please return to the emergency department.    Thank you for choosing [facility]'s Emergency Department. It was a pleasure taking care of you today. If you have any questions, please call [phone].    Pallavi Hinton PA-C

## 2020-11-28 NOTE — ED TRIAGE NOTES
Presented to urgent care with pain to the middle of his back that feels like it is burning.  But was noted to have high blood pressure and low grade fever so was sent to the ED.  Last night pain radiated around bilateral flank area.  Did take tylenol and advil with no relief.

## 2020-11-30 ENCOUNTER — OFFICE VISIT (OUTPATIENT)
Dept: FAMILY MEDICINE | Facility: OTHER | Age: 40
End: 2020-11-30
Payer: COMMERCIAL

## 2020-11-30 VITALS
SYSTOLIC BLOOD PRESSURE: 158 MMHG | BODY MASS INDEX: 39.17 KG/M2 | RESPIRATION RATE: 16 BRPM | HEIGHT: 75 IN | OXYGEN SATURATION: 97 % | TEMPERATURE: 97.9 F | DIASTOLIC BLOOD PRESSURE: 102 MMHG | WEIGHT: 315 LBS | HEART RATE: 81 BPM

## 2020-11-30 DIAGNOSIS — E66.01 MORBID OBESITY (H): ICD-10-CM

## 2020-11-30 DIAGNOSIS — R06.83 SNORING: ICD-10-CM

## 2020-11-30 DIAGNOSIS — I10 BENIGN ESSENTIAL HYPERTENSION: ICD-10-CM

## 2020-11-30 DIAGNOSIS — K85.90 ACUTE PANCREATITIS, UNSPECIFIED COMPLICATION STATUS, UNSPECIFIED PANCREATITIS TYPE: Primary | ICD-10-CM

## 2020-11-30 LAB
CHOLEST SERPL-MCNC: 176 MG/DL
HDLC SERPL-MCNC: 60 MG/DL
LDLC SERPL CALC-MCNC: 94 MG/DL
NONHDLC SERPL-MCNC: 116 MG/DL
TRIGL SERPL-MCNC: 108 MG/DL
TSH SERPL DL<=0.005 MIU/L-ACNC: 0.91 MU/L (ref 0.4–4)

## 2020-11-30 PROCEDURE — 80061 LIPID PANEL: CPT | Performed by: FAMILY MEDICINE

## 2020-11-30 PROCEDURE — 84443 ASSAY THYROID STIM HORMONE: CPT | Performed by: FAMILY MEDICINE

## 2020-11-30 PROCEDURE — 36415 COLL VENOUS BLD VENIPUNCTURE: CPT | Performed by: FAMILY MEDICINE

## 2020-11-30 PROCEDURE — 99214 OFFICE O/P EST MOD 30 MIN: CPT | Performed by: FAMILY MEDICINE

## 2020-11-30 RX ORDER — IBUPROFEN 200 MG
TABLET ORAL
COMMUNITY
End: 2023-09-29

## 2020-11-30 RX ORDER — HYDROCHLOROTHIAZIDE 12.5 MG/1
12.5 TABLET ORAL DAILY
Qty: 30 TABLET | Refills: 1 | Status: SHIPPED | OUTPATIENT
Start: 2020-11-30 | End: 2020-12-22

## 2020-11-30 ASSESSMENT — MIFFLIN-ST. JEOR: SCORE: 2440.34

## 2020-11-30 NOTE — PROGRESS NOTES
"Subjective     Davie Honeycutt is a 40 year old male who presents to clinic today for the following health issues:    History of Present Illness       Hypertension: He presents for follow up of hypertension.  He does not check blood pressure  regularly outside of the clinic. Outside blood pressures have been over 140/90. He does not follow a low salt diet.     He eats 0-1 servings of fruits and vegetables daily.He consumes 1 sweetened beverage(s) daily.He exercises with enough effort to increase his heart rate 10 to 19 minutes per day.    He is taking medications regularly.           ED/UC Followup:    Facility:  [facility] ED  Date of visit: 11/28/2020  Reason for visit: Acute pancreatitis   Current Status: 0/10 on pain scale. \" I feel a lot better.\"      Review of Systems   Constitutional, HEENT, cardiovascular, pulmonary, GI, , musculoskeletal, neuro, skin, endocrine and psych systems are negative, except as otherwise noted.       Objective    BP (!) 158/102   Pulse 81   Temp 97.9  F (36.6  C) (Temporal)   Resp 16   Ht 1.905 m (6' 3\")   Wt 144.5 kg (318 lb 8 oz)   SpO2 97%   BMI 39.81 kg/m    Body mass index is 39.81 kg/m .  Physical Exam   GENERAL: healthy, alert and no distress  NECK: no adenopathy, no asymmetry, masses, or scars and thyroid normal to palpation  RESP: lungs clear to auscultation - no rales, rhonchi or wheezes  CV: regular rate and rhythm, normal S1 S2, no S3 or S4, no murmur, click or rub, no peripheral edema and peripheral pulses strong  ABDOMEN: soft, nontender, no hepatosplenomegaly, no masses and bowel sounds normal  MS: no gross musculoskeletal defects noted, no edema    1. Morbid obesity (H)  Check thyroid . Advised weight loss  - TSH with free T4 reflex    2. Benign essential hypertension  Persistently elevated blood pressures. Start hydrochlorothiazide . Recheck in 1 month for follow up. Advised low salt diet and regular exercise  - Lipid panel reflex to direct LDL Fasting  - " hydrochlorothiazide (HYDRODIURIL) 12.5 MG tablet; Take 1 tablet (12.5 mg) by mouth daily  Dispense: 30 tablet; Refill: 1    3. Snoring  R/o sleep apnea as possible trigger for hypertension. Referral placed.  - SLEEP EVALUATION & MANAGEMENT REFERRAL - [facility] [place]  [phone] (Age 15 and up); Future    4. Acute pancreatitis, unspecified complication status, unspecified pancreatitis type   Improved pain. Has not needed pain medications since this morning. Passing gas. Discussed advancing diet . Advised to strictly avoid alcohol as it could have been the trigger.  Discussed home care  Reportable signs and symptoms discussed  RTC if symptoms persist or fail to improve

## 2020-11-30 NOTE — PROGRESS NOTES
"Subjective     Davie Honeycutt is a 40 year old male who presents to clinic today for the following health issues:    History of Present Illness       Hypertension: He presents for follow up of hypertension.  He does not check blood pressure  regularly outside of the clinic. Outside blood pressures have been over 140/90. He does not follow a low salt diet.     He eats 0-1 servings of fruits and vegetables daily.He consumes 1 sweetened beverage(s) daily.He exercises with enough effort to increase his heart rate 10 to 19 minutes per day.    He is taking medications regularly.             Hospital Follow-up Visit:    Hospital/Nursing Home/IP Rehab Facility: [facility]  Date of Admission: 11/28/2020  Date of Discharge: 11/28/2020  Reason(s) for Admission: Pancreatitis      Was your hospitalization related to COVID-19? No   Problems taking medications regularly:  None  Medication changes since discharge: None  Problems adhering to non-medication therapy:  None    Summary of hospitalization:  {HOSPITAL DISCHARGE SUMMARY INFO:656880::\"[facility] discharge summary reviewed\"}  Diagnostic Tests/Treatments reviewed.  Follow up needed: {NONE DEFAULTED:385405::\"none\"}  Other Healthcare Providers Involved in Patient s Care:         {those currently involved after discharge:733128::\"None\"}  Update since discharge: {IMPROVED DEFAULT:709705::\"improved.\"} {TIP  Include information from family/caregivers, SNF, Care Coordination :139597}      Post Discharge Medication Reconciliation: {ACO Med Rec (Provider):332922}.  Plan of care communicated with {Communicate Plan to:002723::\"patient\"}     {Reference  Coding guidelines- Moderate Complexity F2F/Video within 7 - 14 days of discharge 69979, High Complexity F2F/Video within 7 days 59686 or [id number] days 87377 :048861}         {additonal problems for provider to add (Optional):111488}    Review of Systems   {ROS COMP (Optional):967348}      Objective    There " were no vitals taken for this visit.  There is no height or weight on file to calculate BMI.  Physical Exam   {Exam List (Optional):050576}    {Diagnostic Test Results (Optional):542478}        {PROVIDER CHARTING PREFERENCE:792208}

## 2020-12-13 NOTE — PROGRESS NOTES
Vasectomy Consult    Reason for visit:   Discuss as a method of birth control/sterilization.  He is interested in vasectomy scrotally.  Patient has 2 children and desires sterilization.  Does not want to use condom or having partners on birth control pills.  He has no erections problem.  He has no urinary complaints.    No current outpatient prescriptions on file.     No Known Allergies  Past Medical History:   Diagnosis Date     Hypertension      No past surgical history on file.   Family History   Problem Relation Age of Onset     CEREBROVASCULAR DISEASE Maternal Grandfather      Genitourinary Problems Mother      CANCER Father      skin     C.A.D. Paternal Uncle      Hypertension Paternal Uncle      Hypertension Maternal Aunt      Emphysema Maternal Grandmother      Coronary Artery Disease Paternal Grandmother 60     triple bypass,  at 84     Coronary Artery Disease Paternal Grandfather 50     had triple bypass,  at 82     Social History     Social History     Marital status:      Spouse name: N/A     Number of children: N/A     Years of education: N/A     Social History Main Topics     Smoking status: Former Smoker     Smokeless tobacco: Current User     Types: Chew     Alcohol use Yes      Comment: weekends/social, 6-8 beers per night on weekends     Drug use: No     Sexual activity: Yes     Partners: Female     Birth control/ protection: Pill     Other Topics Concern     Parent/Sibling W/ Cabg, Mi Or Angioplasty Before 65f 55m? No     Social History Narrative       REVIEW OF SYSTEMS  =================  C: NEGATIVE for fever, chills, change in weight  I: NEGATIVE for worrisome rashes, moles or lesions  E/M: NEGATIVE for ear, mouth and throat problems  R: NEGATIVE for significant cough or SHORTNESS OF BREATH  CV:  NEGATIVE for chest pain, palpitations or peripheral edema  GI: NEGATIVE for nausea, abdominal pain, heartburn, or change in bowel habits  NEURO: NEGATIVE numbness/weakness  :  see HPI  PSYCH: NEGATIVE depression/anxiety  LYmph: no new enlarged lymph nodes  Ortho: no new trauma/movements      Physical Exam:  /72 (BP Location: Right arm, Patient Position: Chair, Cuff Size: Adult Large)  Pulse 80  Resp 14   Patient is pleasant, in no acute distress, good general condition.  HEENT:  Normalcephalic, atraumatic  Lung: no evidence of respiratory distress    Abdomen: Soft, nondistended, non tender. No masses. No rebound or guarding.   Exam: penis no d/c testis no masses bilateral vas palpated no scrotal lesion  Skin: Warm and dry.  No redness.  Neuro: grossly normal  Psych normal mood and affect  Musculoskeletal  moving all extremities        Discussed    That vasectomy is permanent method of birth control.    That vasectomy can fail due to recanalization of the vas even many months/years later.    That he needs 2 negative sperm checks to be considered sterile    That there are other methods that are not permanent and also that the sperm can be frozen for later use.    How the technique is performed, risks of infection, bleeding, damage to the testes vessels and testes atrophy    Long term complication such as chronic and difficult to treat testes pain and questionable increase incidence of prostate cancer    That the procedure can be done at the clinic or hospital OR        Plan:    Stop Aspirin  Will schedule Vasectomy in the future         finger

## 2020-12-16 NOTE — PROGRESS NOTES
"Davie Honeycutt is a 40 year old male who is being evaluated via a billable telephone visit.      Davie Honeycutt is a 40 year old male who is being evaluated via a billable video visit.      The patient has been notified of following:     \"This video visit will be conducted via a call between you and your physician/provider. We have found that certain health care needs can be provided without the need for an in-person physical exam.  This service lets us provide the care you need with a video conversation.  If a prescription is necessary we can send it directly to your pharmacy.  If lab work is needed we can place an order for that and you can then stop by our lab to have the test done at a later time.    Video visits are billed at different rates depending on your insurance coverage.  Please reach out to your insurance provider with any questions.    If during the course of the call the physician/provider feels a video visit is not appropriate, you will not be charged for this service.\"    Patient has given verbal consent for Video visit? Yes  How would you like to obtain your AVS? Mail a copy  If you are dropped from the video visit, the video invite should be resent to: Text to cell phone: [phone]  Will anyone else be joining your video visit? No        Video-Visit Details    Type of service:  Video Visit    Video Start Time: 14:40  Video End Time: 15:03    Originating Location (pt. Location): Home    Distant Location (provider location):  [facility]     Platform used for Video Visit: Doximity                Does Davie have a CPAP/Bipap?  No     [place] Sleep Scale: 12          Sleep Consultation:    Date on this visit: 12/17/2020    Davie Honeycutt is sent by Gloria Kumar for a sleep consultation regarding snoring.    Primary Physician: Jarod Navarrete     Davie Honeycutt reports nightly snoring and apneas for 14 years.     Davie goes to sleep at 10:30 PM during the " week. He wakes up at 7:00 AM without an alarm. He falls asleep in 20 minutes.  Davie denies difficulty falling asleep.  He wakes up 4 to 5 times a night for 20 minutes before falling back to sleep.  Davie wakes up to go to the bathroom and uncertain reasons.  On weekends, Davie keeps similar schedule.  Patient gets an average of 6 to 8 hours of sleep per night.     Patient does not use electronics in bed, watch TV in bed and read in bed.     Davie  doesn't shift work.  He works day shifts.      Davie does snore every night. Patient does have a regular bed partner. There is report of snoring.  He does have witnessed apneas. They occasionally sleep separately.  Patient sleeps on his back and side. He denies occasional restless legs. Davie denies any bruxism, sleep walking, sleep talking, dream enactment, sleep paralysis, cataplexy and hypnogogic/hypnopompic hallucinations.    He denies sleep walking, sleep talking, enuresis and sleep terrors as a child.  Davie denies difficulty breathing through his nose, claustrophobia, reflux at night, heartburn and depression.      Davie has gained 0-5 pounds in the last year.   Patient's [place] Sleepiness score 12/24 consistent with moderate daytime sleepiness.      Davie naps 1-2 times per day for 20-30 minutes, feels refreshed after naps. He takes no inadvertant naps.  He admits dozing while driving only on long trips.  Patient was counseled on the importance of driving while alert, to pull over if drowsy, or nap before getting into the vehicle if sleepy.  He uses 1 to 2 cups/day of coffee. Last caffeine intake is usually before noon.    Allergies:    No Known Allergies    Medications:    Current Outpatient Medications   Medication Sig Dispense Refill     hydrochlorothiazide (HYDRODIURIL) 12.5 MG tablet Take 1 tablet (12.5 mg) by mouth daily 30 tablet 1     ibuprofen (ADVIL/MOTRIN) 200 MG tablet          Problem List:  Patient Active Problem List    Diagnosis Date Noted      Morbid obesity (H) 11/30/2020     Priority: Medium     HTN (hypertension) 08/26/2011     Priority: Medium     Intermittent asthma 08/26/2011     Priority: Medium        Past Medical/Surgical History:  Past Medical History:   Diagnosis Date     Hypertension      No past surgical history on file.    Social History:  Social History     Socioeconomic History     Marital status:      Spouse name: Not on file     Number of children: Not on file     Years of education: Not on file     Highest education level: Not on file   Occupational History     Not on file   Social Needs     Financial resource strain: Not on file     Food insecurity     Worry: Not on file     Inability: Not on file     Transportation needs     Medical: Not on file     Non-medical: Not on file   Tobacco Use     Smoking status: Former Smoker     Smokeless tobacco: Current User     Types: Chew   Substance and Sexual Activity     Alcohol use: Yes     Comment: weekends/social, 6-8 beers per night on weekends     Drug use: No     Sexual activity: Yes     Partners: Female     Birth control/protection: Pill   Lifestyle     Physical activity     Days per week: Not on file     Minutes per session: Not on file     Stress: Not on file   Relationships     Social connections     Talks on phone: Not on file     Gets together: Not on file     Attends Confucianism service: Not on file     Active member of club or organization: Not on file     Attends meetings of clubs or organizations: Not on file     Relationship status: Not on file     Intimate partner violence     Fear of current or ex partner: Not on file     Emotionally abused: Not on file     Physically abused: Not on file     Forced sexual activity: Not on file   Other Topics Concern     Parent/sibling w/ CABG, MI or angioplasty before 65F 55M? No   Social History Narrative     Not on file       Family History:  Family History   Problem Relation Age of Onset     Cerebrovascular Disease Maternal Grandfather       Genitourinary Problems Mother      Cancer Father         skin     Emphysema Maternal Grandmother      Coronary Artery Disease Paternal Grandmother 60        triple bypass,  at 84     Coronary Artery Disease Paternal Grandfather 50        had triple bypass,  at 82     C.A.D. Paternal Uncle      Hypertension Paternal Uncle      Hypertension Maternal Aunt        Review of Systems:  A complete review of systems reviewed by me is negative with the exeption of what has been mentioned in the history of present illness.  CONSTITUTIONAL: NEGATIVE for weight gain/loss, fever, chills, sweats or night sweats, drug allergies.  EYES: NEGATIVE for changes in vision, blind spots, double vision.  ENT: NEGATIVE for ear pain, sore throat, sinus pain, post-nasal drip, runny nose, bloody nose  CARDIAC: NEGATIVE for fast heartbeats or fluttering in chest, chest pain or pressure, breathlessness when lying flat, swollen legs or swollen feet.  NEUROLOGIC: NEGATIVE headaches, weakness or numbness in the arms or legs.  DERMATOLOGIC: NEGATIVE for rashes, new moles or change in mole(s)  PULMONARY: NEGATIVE SOB at rest, SOB with activity, dry cough, productive cough, coughing up blood, wheezing or whistling when breathing.    GASTROINTESTINAL: NEGATIVE for nausea or vomitting, loose or watery stools, fat or grease in stools, constipation, abdominal pain, bowel movements black in color or blood noted.  GENITOURINARY: NEGATIVE for pain during urination, blood in urine, urinating more frequently than usual, irregular menstrual periods.  MUSCULOSKELETAL: NEGATIVE for muscle pain, bone or joint pain, swollen joints.  ENDOCRINE: NEGATIVE for increased thirst or urination, diabetes.  LYMPHATIC: NEGATIVE for swollen lymph nodes, lumps or bumps in the breasts or nipple discharge.    Physical Examination:  Vitals: There were no vitals taken for this visit.  BMI= There is no height or weight on file to calculate BMI.         No  flowsheet data found.         GENERAL APPEARANCE: healthy, alert, no distress, cooperative and over weight  EYES: Eyes grossly normal to inspection, PERRL and conjunctivae and sclerae normal  NEURO: Normal strength and tone, mentation intact and speech normal  PSYCH: mentation appears normal and affect normal/bright  Mallampati Class: III.  Tonsillar Stage: 1  hidden by pillars.  Long soft palate, class 2 occlusion  Impression/Plan:    Patient with history of snoring witnessed apneas excessive daytime sleepiness hypertension also apparently strong family history of obstructive sleep apnea.  His stop bang score is 5.  He will be excellent candidate for home sleep study since there is high index of suspicion for moderate to severe obstructive sleep apnea.     He will follow up with me in approximately two weeks after his sleep study has been competed to review the results and discuss plan of care.     23 minutes spent today in virtual counseling consulting with the patient.  Polysomnography reviewed.  Obstructive sleep apnea reviewed.  Complications of untreated sleep apnea were reviewed.    Yoshi Webb MD      CC: Gloria Kumar

## 2020-12-17 ENCOUNTER — VIRTUAL VISIT (OUTPATIENT)
Dept: SLEEP MEDICINE | Facility: CLINIC | Age: 40
End: 2020-12-17
Attending: FAMILY MEDICINE
Payer: COMMERCIAL

## 2020-12-17 DIAGNOSIS — G47.19 EXCESSIVE DAYTIME SLEEPINESS: Primary | ICD-10-CM

## 2020-12-17 DIAGNOSIS — I10 ESSENTIAL HYPERTENSION: ICD-10-CM

## 2020-12-17 DIAGNOSIS — R06.83 SNORING: ICD-10-CM

## 2020-12-17 DIAGNOSIS — R06.81 APNEA: ICD-10-CM

## 2020-12-17 PROCEDURE — 99203 OFFICE O/P NEW LOW 30 MIN: CPT | Mod: 95 | Performed by: OTOLARYNGOLOGY

## 2020-12-22 ENCOUNTER — TELEPHONE (OUTPATIENT)
Dept: FAMILY MEDICINE | Facility: OTHER | Age: 40
End: 2020-12-22

## 2020-12-22 ENCOUNTER — OFFICE VISIT (OUTPATIENT)
Dept: FAMILY MEDICINE | Facility: OTHER | Age: 40
End: 2020-12-22
Payer: COMMERCIAL

## 2020-12-22 VITALS
WEIGHT: 314 LBS | BODY MASS INDEX: 39.25 KG/M2 | HEART RATE: 85 BPM | SYSTOLIC BLOOD PRESSURE: 136 MMHG | DIASTOLIC BLOOD PRESSURE: 88 MMHG | OXYGEN SATURATION: 96 % | TEMPERATURE: 98.3 F

## 2020-12-22 DIAGNOSIS — R73.9 HYPERGLYCEMIA: ICD-10-CM

## 2020-12-22 DIAGNOSIS — E66.01 MORBID OBESITY (H): ICD-10-CM

## 2020-12-22 DIAGNOSIS — I10 BENIGN ESSENTIAL HYPERTENSION: Primary | ICD-10-CM

## 2020-12-22 DIAGNOSIS — L71.9 ROSACEA: ICD-10-CM

## 2020-12-22 LAB
ANION GAP SERPL CALCULATED.3IONS-SCNC: 5 MMOL/L (ref 3–14)
BUN SERPL-MCNC: 10 MG/DL (ref 7–30)
CALCIUM SERPL-MCNC: 9.2 MG/DL (ref 8.5–10.1)
CHLORIDE SERPL-SCNC: 108 MMOL/L (ref 94–109)
CO2 SERPL-SCNC: 29 MMOL/L (ref 20–32)
CREAT SERPL-MCNC: 0.7 MG/DL (ref 0.66–1.25)
GFR SERPL CREATININE-BSD FRML MDRD: >90 ML/MIN/{1.73_M2}
GLUCOSE SERPL-MCNC: 109 MG/DL (ref 70–99)
HBA1C MFR BLD: 5.8 % (ref 0–5.6)
POTASSIUM SERPL-SCNC: 4 MMOL/L (ref 3.4–5.3)
SODIUM SERPL-SCNC: 142 MMOL/L (ref 133–144)

## 2020-12-22 PROCEDURE — 36415 COLL VENOUS BLD VENIPUNCTURE: CPT | Performed by: FAMILY MEDICINE

## 2020-12-22 PROCEDURE — 99214 OFFICE O/P EST MOD 30 MIN: CPT | Performed by: FAMILY MEDICINE

## 2020-12-22 PROCEDURE — 83036 HEMOGLOBIN GLYCOSYLATED A1C: CPT | Performed by: FAMILY MEDICINE

## 2020-12-22 PROCEDURE — 80048 BASIC METABOLIC PNL TOTAL CA: CPT | Performed by: FAMILY MEDICINE

## 2020-12-22 RX ORDER — METRONIDAZOLE 7.5 MG/G
GEL TOPICAL 2 TIMES DAILY
Qty: 45 G | Refills: 11 | Status: SHIPPED | OUTPATIENT
Start: 2020-12-22 | End: 2021-02-24

## 2020-12-22 RX ORDER — HYDROCHLOROTHIAZIDE 25 MG/1
25 TABLET ORAL DAILY
Qty: 90 TABLET | Refills: 0 | Status: SHIPPED | OUTPATIENT
Start: 2020-12-22 | End: 2021-03-24

## 2020-12-22 ASSESSMENT — ANXIETY QUESTIONNAIRES
GAD7 TOTAL SCORE: 0
GAD7 TOTAL SCORE: 0
7. FEELING AFRAID AS IF SOMETHING AWFUL MIGHT HAPPEN: NOT AT ALL
1. FEELING NERVOUS, ANXIOUS, OR ON EDGE: NOT AT ALL
4. TROUBLE RELAXING: NOT AT ALL
3. WORRYING TOO MUCH ABOUT DIFFERENT THINGS: NOT AT ALL
7. FEELING AFRAID AS IF SOMETHING AWFUL MIGHT HAPPEN: NOT AT ALL
GAD7 TOTAL SCORE: 0
6. BECOMING EASILY ANNOYED OR IRRITABLE: NOT AT ALL
5. BEING SO RESTLESS THAT IT IS HARD TO SIT STILL: NOT AT ALL
2. NOT BEING ABLE TO STOP OR CONTROL WORRYING: NOT AT ALL

## 2020-12-22 ASSESSMENT — PATIENT HEALTH QUESTIONNAIRE - PHQ9
10. IF YOU CHECKED OFF ANY PROBLEMS, HOW DIFFICULT HAVE THESE PROBLEMS MADE IT FOR YOU TO DO YOUR WORK, TAKE CARE OF THINGS AT HOME, OR GET ALONG WITH OTHER PEOPLE: NOT DIFFICULT AT ALL
SUM OF ALL RESPONSES TO PHQ QUESTIONS 1-9: 0
SUM OF ALL RESPONSES TO PHQ QUESTIONS 1-9: 0

## 2020-12-22 NOTE — LETTER
My Asthma Action Plan    Name: Davie Honeycutt   YOB: 1980  Date: 12/22/2020   My doctor: Gloria Kumar MD   My clinic: [facility]        My Rescue Medicine:   Albuterol inhaler (Proair/Ventolin/Proventil HFA)  2-4 puffs EVERY 4 HOURS as needed. Use a spacer if recommended by your provider.   My Asthma Severity:   Intermittent / Exercise Induced  Know your asthma triggers: unknown             GREEN ZONE   Good Control    I feel good    No cough or wheeze    Can work, sleep and play without asthma symptoms       Take your asthma control medicine every day.     1. If exercise triggers your asthma, take your rescue medication    15 minutes before exercise or sports, and    During exercise if you have asthma symptoms  2. Spacer to use with inhaler: If you have a spacer, make sure to use it with your inhaler             YELLOW ZONE Getting Worse  I have ANY of these:    I do not feel good    Cough or wheeze    Chest feels tight    Wake up at night   1. Keep taking your Green Zone medications  2. Start taking your rescue medicine:    every 20 minutes for up to 1 hour. Then every 4 hours for 24-48 hours.  3. If you stay in the Yellow Zone for more than 12-24 hours, contact your doctor.  4. If you do not return to the Green Zone in 12-24 hours or you get worse, start taking your oral steroid medicine if prescribed by your provider.           RED ZONE Medical Alert - Get Help  I have ANY of these:    I feel awful    Medicine is not helping    Breathing getting harder    Trouble walking or talking    Nose opens wide to breathe       1. Take your rescue medicine NOW  2. If your provider has prescribed an oral steroid medicine, start taking it NOW  3. Call your doctor NOW  4. If you are still in the Red Zone after 20 minutes and you have not reached your doctor:    Take your rescue medicine again and    Call 911 or go to the emergency room right away    See your regular doctor within  2 weeks of an Emergency Room or Urgent Care visit for follow-up treatment.          Annual Reminders:  Meet with Asthma Educator,  Flu Shot in the Fall, consider Pneumonia Vaccination for patients with asthma (aged 19 and older).    Pharmacy:    [id number] - [place], [place] - [address] [facility] [place] [address] [address]  [facility] - [place], [place] - [address] [id number] - [place], [place] - [address] AT [facility]  [address]    Electronically signed by Gloria Kumar MD   Date: 12/22/20                    Asthma Triggers  How To Control Things That Make Your Asthma Worse    Triggers are things that make your asthma worse.  Look at the list below to help you find your triggers and   what you can do about them. You can help prevent asthma flare-ups by staying away from your triggers.      Trigger                                                          What you can do   Cigarette Smoke  Tobacco smoke can make asthma worse. Do not allow smoking in your home, car or around you.  Be sure no one smokes at a child s day care or school.  If you smoke, ask your health care provider for ways to help you quit.  Ask family members to quit too.  Ask your health care provider for a referral to Quit Plan to help you quit smoking, or call 1-458-551-PLAN.     Colds, Flu, Bronchitis  These are common triggers of asthma. Wash your hands often.  Don t touch your eyes, nose or mouth.  Get a flu shot every year.     Dust Mites  These are tiny bugs that live in cloth or carpet. They are too small to see. Wash sheets and blankets in hot water every week.   Encase pillows and mattress in dust mite proof covers.  Avoid having carpet if you can. If you have carpet, vacuum weekly.   Use a dust mask and HEPA vacuum.   Pollen and Outdoor Mold  Some people are allergic to trees, grass, or weed pollen, or molds. Try to keep your windows  closed.  Limit time out doors when pollen count is high.   Ask you health care provider about taking medicine during allergy season.     Animal Dander  Some people are allergic to skin flakes, urine or saliva from pets with fur or feathers. Keep pets with fur or feathers out of your home.    If you can t keep the pet outdoors, then keep the pet out of your bedroom.  Keep the bedroom door closed.  Keep pets off cloth furniture and away from stuffed toys.     Mice, Rats, and Cockroaches  Some people are allergic to the waste from these pests.   Cover food and garbage.  Clean up spills and food crumbs.  Store grease in the refrigerator.   Keep food out of the bedroom.   Indoor Mold  This can be a trigger if your home has high moisture. Fix leaking faucets, pipes, or other sources of water.   Clean moldy surfaces.  Dehumidify basement if it is damp and smelly.   Smoke, Strong Odors, and Sprays  These can reduce air quality. Stay away from strong odors and sprays, such as perfume, powder, hair spray, paints, smoke incense, paint, cleaning products, candles and new carpet.   Exercise or Sports  Some people with asthma have this trigger. Be active!  Ask your doctor about taking medicine before sports or exercise to prevent symptoms.    Warm up for 5-10 minutes before and after sports or exercise.     Other Triggers of Asthma  Cold air:  Cover your nose and mouth with a scarf.  Sometimes laughing or crying can be a trigger.  Some medicines and food can trigger asthma.

## 2020-12-22 NOTE — TELEPHONE ENCOUNTER
LM for patient to return call for results:            Hba1c is at 5.8 showing signs of prediabetes. Advise low carb diet and weight loss to prevent diabetes. Advise recheck in 6 months      His other labs are in the normal range.

## 2020-12-22 NOTE — PROGRESS NOTES
Subjective    Chronic problems general questions HPI Form  I  Davie Honeycutt is a 40 year old male who presents to clinic today for the following health issues:    History of Present Illness       Hypertension: He presents for follow up of hypertension.  He does not check blood pressure  regularly outside of the clinic. Outside blood pressures have been over 140/90. He does not follow a low salt diet.     He eats 0-1 servings of fruits and vegetables daily.He consumes 1 sweetened beverage(s) daily.He exercises with enough effort to increase his heart rate 30 to 60 minutes per day.    He is taking medications regularly.      Would like to have meds for rosacea     Hypertension Follow-up    Review of Systems   Constitutional, HEENT, cardiovascular, pulmonary, GI, , musculoskeletal, neuro, skin, endocrine and psych systems are negative, except as otherwise noted.      Objective    BP (!) 162/90   Pulse 85   Temp 98.3  F (36.8  C) (Temporal)   Wt 142.4 kg (314 lb)   SpO2 96%   BMI 39.25 kg/m    Body mass index is 39.25 kg/m .  Physical Exam  Constitutional:       Appearance: Normal appearance.   HENT:      Head: Normocephalic and atraumatic.   Neck:      Musculoskeletal: Normal range of motion.   Cardiovascular:      Rate and Rhythm: Normal rate and regular rhythm.      Pulses: Normal pulses.      Heart sounds: Normal heart sounds.   Pulmonary:      Effort: Pulmonary effort is normal.      Breath sounds: Normal breath sounds.   Musculoskeletal:      Right lower leg: No edema.      Left lower leg: No edema.   Skin:     Comments: Rash b/l cheeks consistent with rosacea.    Neurological:      Mental Status: He is alert.   Psychiatric:         Mood and Affect: Mood normal.         Behavior: Behavior normal.            No results found for this or any previous visit (from the past 24 hour(s)).      1. Benign essential hypertension  Improved blood pressures but not quite at goal. Increase hydrochlorothiazide to  25mg. Is in the process of completing a home study for Sleep apnea. Will await those results  - hydrochlorothiazide (HYDRODIURIL) 25 MG tablet; Take 1 tablet (25 mg) by mouth daily  Dispense: 90 tablet; Refill: 0  - Basic metabolic panel  (Ca, Cl, CO2, Creat, Gluc, K, Na, BUN)    2. Hyperglycemia  - Hemoglobin A1c    3. Rosacea  Trial topical metronidazole  - metroNIDAZOLE (METROGEL) 0.75 % external gel; Apply topically 2 times daily  Dispense: 45 g; Refill: 11    4. Morbid obesity (H)  Lost 4 pounds in the last month. Encouraged to contnue daily exercise and diet modification

## 2020-12-23 ASSESSMENT — ANXIETY QUESTIONNAIRES: GAD7 TOTAL SCORE: 0

## 2020-12-23 ASSESSMENT — PATIENT HEALTH QUESTIONNAIRE - PHQ9: SUM OF ALL RESPONSES TO PHQ QUESTIONS 1-9: 0

## 2021-01-06 ENCOUNTER — OFFICE VISIT (OUTPATIENT)
Dept: SLEEP MEDICINE | Facility: CLINIC | Age: 41
End: 2021-01-06
Payer: COMMERCIAL

## 2021-01-06 DIAGNOSIS — G47.33 OSA (OBSTRUCTIVE SLEEP APNEA): Primary | ICD-10-CM

## 2021-01-06 PROCEDURE — G0399 HOME SLEEP TEST/TYPE 3 PORTA: HCPCS | Performed by: OTOLARYNGOLOGY

## 2021-01-07 ENCOUNTER — DOCUMENTATION ONLY (OUTPATIENT)
Dept: SLEEP MEDICINE | Facility: CLINIC | Age: 41
End: 2021-01-07
Payer: COMMERCIAL

## 2021-01-07 NOTE — PROGRESS NOTES
Pt is completing a home sleep test. Pt was instructed on how to put on the Noxturnal T3 device and associated equipment before going to bed and given the opportunity to practice putting it on before leaving the sleep center. Pt was reminded to bring the home sleep test kit back to the center tomorrow, at agreed upon time for download and reporting.     Adalberto BROWNE CMA

## 2021-01-08 NOTE — PROGRESS NOTES
This HSAT was performed using a Noxturnal T3 device which recorded snore, sound, movement activity, body position, nasal pressure, oronasal thermal airflow, pulse, oximetry and both chest and abdominal respiratory effort. HSAT data was restricted to the time patient states they were in bed.     HSAT was scored using 1B 4% hypopnea rule.     AHI: 35.6. Snoring was reported as loud.  Time with SpO2 below 89% was 42.4 minutes.   Overall signal quality was good     Pt will follow up with sleep provider to determine appropriate therapy.     Ordering Provider, Yoshi Webb MD Charles O., BA, RPSGT, RST System Clinical Specialist 1/8/2021

## 2021-01-08 NOTE — PROGRESS NOTES
"HOME SLEEP STUDY INTERPRETATION    Patient: Davie Honeycutt  MRN: [id number]  YOB: 1980  Study Date: 2021  Referring Provider: Jarod Navarrete  Ordering Provider: Yoshi Webb MD     Indications for Home Study: Davie Honeycutt is a 40 year old male with a history of snoring, excessive daytime sleepiness, apneas who presents with symptoms suggestive of obstructive sleep apnea.    Estimated body mass index is 39.25 kg/m  as calculated from the following:    Height as of 20: 1.905 m (6' 3\").    Weight as of 20: 142.4 kg (314 lb).  [place] Sleepiness Scale:   STOP-BAN/8    Data: A full night home sleep study was performed recording the standard physiologic parameters including body position, movement, sound, nasal pressure, thermal oral airflow, chest and abdominal movements with respiratory inductance plethysmography, and oxygen saturation by pulse oximetry. Pulse rate was estimated by oximetry recording. This study was considered adequate based on > 4 hours of quality oximetry and respiratory recording. As specified by the AASM Manual for the Scoring of Sleep and Associated events, version 2.3, Rule VIII.D 1B, 4% oxygen desaturation scoring for hypopneas is used as a standard of care on all home sleep apnea testing.    Analysis Time:  400 minutes    Respiration:   Sleep Associated Hypoxemia: sustained hypoxemia was present. Baseline oxygen saturation was 100%.  Time with saturation less than or equal to 88% was 42 minutes. The lowest oxygen saturation was 67%.   Snoring: Snoring was present.  Respiratory events: The home study revealed a presence of 154 obstructive apneas and 17 mixed and central apneas. There were 71 hypopneas resulting in a combined apnea/hypopnea index [AHI] of 35.6 events per hour.  AHI was 41.9 per hour supine, 0 per hour prone, 21.9 per hour on left side, and 29.2 per hour on right side.   Pattern: Excluding events noted above, respiratory " rate and pattern was Normal.    Position: Percent of time spent: supine - 59.7%, prone - 0%, on left - 16.1%, on right - 24.2%.    Heart Rate: By pulse oximetry tachycardia was noted.     Assessment:   Severe obstructive sleep apnea.  Sleep associated hypoxemia was present.    Recommendations:  Consider auto-CPAP at 5-15 cmH2O or oral appliance therapy.(only if the patient is intolerant of CPAP)  Suggest optimizing sleep hygiene and avoiding sleep deprivation.  Weight management.(morbid obesity)    Diagnosis Code(s): Obstructive Sleep Apnea G47.33, Hypoxemia G47.36    Yoshi Webb MD,  January 8, 2021   Diplomate, American Board of Otolaryngology, Sleep Medicine

## 2021-01-20 NOTE — PROGRESS NOTES
Does Davie have a CPAP/Bipap?  No    [place] Sleep Scale: 2    Davie is a 40 year old who is being evaluated via a billable video visit.      How would you like to obtain your AVS? Mail a copy  If the video visit is dropped, the invitation should be resent by: [phone]  Will anyone else be joining your video visit? No      Subjective     Davie is a 40 year old who presents to clinic today for the following health issues Home sleep study results.  HPI       Objective           Vitals:  No vitals were obtained today due to virtual visit.    Physical Exam   GENERAL: Healthy, alert and no distress  EYES: Eyes grossly normal to inspection.  No discharge or erythema, or obvious scleral/conjunctival abnormalities.  RESP: No audible wheeze, cough, or visible cyanosis.  No visible retractions or increased work of breathing.    SKIN: Visible skin clear. No significant rash, abnormal pigmentation or lesions.  NEURO: Cranial nerves grossly intact.  Mentation and speech appropriate for age.  PSYCH: Mentation appears normal, affect normal/bright, judgement and insight intact, normal speech and appearance well-groomed.            Video-Visit Details    Type of service:  Video Visit 2:30    Video End Time:2:57    Originating Location (pt. Location): Home    Distant Location (provider location):  [facility]     Platform used for Video Visit: [facility]       Sleep Study Follow-Up Visit:    Date on this visit: 1/21/2021    Davie Honeycutt comes in today for follow-up of his  Home sleep study done on 1/6/21 at the [facility] for loud snoring, excessive daytime sleepiness and possible sleep apnea.      Data: A full night home sleep study was performed recording the standard physiologic parameters including body position, movement, sound, nasal pressure, thermal oral airflow, chest and abdominal movements with respiratory inductance plethysmography, and oxygen saturation by pulse oximetry.  Pulse rate was estimated by oximetry recording. This study was considered adequate based on > 4 hours of quality oximetry and respiratory recording. As specified by the AASM Manual for the Scoring of Sleep and Associated events, version 2.3, Rule VIII.D 1B, 4% oxygen desaturation scoring for hypopneas is used as a standard of care on all home sleep apnea testing.     Analysis Time:  400 minutes     Respiration:   Sleep Associated Hypoxemia: sustained hypoxemia was present. Baseline oxygen saturation was 100%.  Time with saturation less than or equal to 88% was 42 minutes. The lowest oxygen saturation was 67%.   Snoring: Snoring was present.  Respiratory events: The home study revealed a presence of 154 obstructive apneas and 17 mixed and central apneas. There were 71 hypopneas resulting in a combined apnea/hypopnea index [AHI] of 35.6 events per hour.  AHI was 41.9 per hour supine, 0 per hour prone, 21.9 per hour on left side, and 29.2 per hour on right side.   Pattern: Excluding events noted above, respiratory rate and pattern was Normal.     Position: Percent of time spent: supine - 59.7%, prone - 0%, on left - 16.1%, on right - 24.2%.     Heart Rate: By pulse oximetry tachycardia was noted.      Assessment:   Severe obstructive sleep apnea.  Sleep associated hypoxemia was present.     Recommendations:  Consider auto-CPAP at 5-15 cmH2O or oral appliance therapy.(only if the patient is intolerant of CPAP)  Suggest optimizing sleep hygiene and avoiding sleep deprivation.  Weight management.(morbid obesity)      These findings were reviewed with patient.     Past medical/surgical history, family history, social history, medications and allergies were reviewed.      Problem List:  Patient Active Problem List    Diagnosis Date Noted     Rosacea 12/22/2020     Priority: Medium     Morbid obesity (H) 11/30/2020     Priority: Medium     HTN (hypertension) 08/26/2011     Priority: Medium        Impression/Plan:    Patient  with a newly diagnosed severe obstructive sleep apnea.  Today long discussion about the treatment options is undertaken.  We discussed CPAP versus oral appliances versus alternative therapies.  Patient is very interested in the initiation of CPAP.  Auto CPAP will be initiated pressure range 5 to 15 cm of water.  Close clinical follow-up will be performed with a download in a month and a recheck.  Also his morbid obesity discussed importance of weight loss.  He will work with his primary care provider in a weight loss program.  He will follow up with me in about 6 week(s).     27 minutes spent with patient, all of which were spent face-to-face counseling, consulting, coordinating plan of care.      Yoshi Webb MD      CC: Jarod Navarrete

## 2021-01-21 ENCOUNTER — VIRTUAL VISIT (OUTPATIENT)
Dept: SLEEP MEDICINE | Facility: CLINIC | Age: 41
End: 2021-01-21
Payer: COMMERCIAL

## 2021-01-21 DIAGNOSIS — G47.33 OSA (OBSTRUCTIVE SLEEP APNEA): Primary | ICD-10-CM

## 2021-01-21 PROCEDURE — 99213 OFFICE O/P EST LOW 20 MIN: CPT | Mod: 95 | Performed by: OTOLARYNGOLOGY

## 2021-01-25 ENCOUNTER — DOCUMENTATION ONLY (OUTPATIENT)
Dept: SLEEP MEDICINE | Facility: CLINIC | Age: 41
End: 2021-01-25
Payer: COMMERCIAL

## 2021-01-25 NOTE — PROGRESS NOTES
Patient was offered choice of vendor and chose [facility].  Patient Davie Honeycutt was set up at [place] on January 25, 2021. Patient received a Resmed AirSense 10 Auto. Pressures were set at 5-15 cm H2O.   Patient s ramp is 5 cm H2O for Off and FLEX/EPR is EPR.  Patient received a Resmed Mask name: AIRFIT N20  Nasal mask size Large, heated tubing and heated humidifier.  Patient does not need to meet compliance. Patient has a follow up on 3/9/21 with Dr. Webb.    Abigail Bartlett

## 2021-01-28 ENCOUNTER — DOCUMENTATION ONLY (OUTPATIENT)
Dept: SLEEP MEDICINE | Facility: CLINIC | Age: 41
End: 2021-01-28

## 2021-01-28 NOTE — PROGRESS NOTES
3 DAY STM VISIT    Diagnostic AHI:   35.6  HST    Patient contacted for 3 day STM visit    Confirmed with patient at time of call- N/A Patient is still interested in STM service     Message left for patient to return call    Replacement device: No  STM ordered by provider: Yes     Device type: Auto-CPAP  PAP settings from order::  CPAP min 5 cm  H20       CPAP max 15 cm  H20         Mask type:    Nasal Mask     Device settings from machine      Min CPAP 5.0            Max CPAP 15.0                EPR level Setting: TWO      RESMED Soft response setting:  OFF  Assessment: Nightly usage over four hours.  Action plan: Patient to have 14 day STM visit. Patient has a follow up visit scheduled:   yes within 31-90 days of set up.     Total time spent on accessing and  interpreting remote patient PAP therapy data  10 minutes  Total time spent counseling, coaching  and reviewing PAP therapy data with patient  0 minutes  [id number] no

## 2021-02-08 ENCOUNTER — APPOINTMENT (OUTPATIENT)
Dept: SLEEP MEDICINE | Facility: CLINIC | Age: 41
End: 2021-02-08
Payer: COMMERCIAL

## 2021-02-09 ENCOUNTER — DOCUMENTATION ONLY (OUTPATIENT)
Dept: SLEEP MEDICINE | Facility: CLINIC | Age: 41
End: 2021-02-09

## 2021-02-09 NOTE — PROGRESS NOTES
14  DAY STM VISIT    Diagnostic AHI:  35.6 HST    Unable to leave message patient mailbox is full.      Assessment: Pt meeting objective benchmarks.       Action plan: pt to have 30 day STM visit.      Device type: Auto-CPAP    PAP settings: CPAP min 5.0 cm  H20       CPAP max 15.0 cm  H20        95th% pressure 10.5 cm  H20        RESMED EPR level Setting: TWO    RESMED Soft response setting:  OFF    Mask type:  Nasal Mask    Objective measures: 14 day rolling measures      Compliance  100 %      Leak  7.12  lpm  last  upload      AHI 0.82   last  upload      Average number of minutes 440      Objective measure goal  Compliance   Goal >70%  Leak   Goal < 24 lpm  AHI  Goal < 5  Usage  Goal >240        Total time spent on accessing and interpreting remote patient PAP therapy data  10 minutes    Total time spent counseling, coaching  and reviewing PAP therapy data with patient  1 minutes    [id number]  [id number]  no (3 day STM)

## 2021-02-23 ENCOUNTER — MEDICAL CORRESPONDENCE (OUTPATIENT)
Dept: HEALTH INFORMATION MANAGEMENT | Facility: CLINIC | Age: 41
End: 2021-02-23

## 2021-02-24 ENCOUNTER — OFFICE VISIT (OUTPATIENT)
Dept: FAMILY MEDICINE | Facility: OTHER | Age: 41
End: 2021-02-24
Payer: COMMERCIAL

## 2021-02-24 VITALS
BODY MASS INDEX: 38.67 KG/M2 | WEIGHT: 311 LBS | OXYGEN SATURATION: 97 % | RESPIRATION RATE: 16 BRPM | SYSTOLIC BLOOD PRESSURE: 158 MMHG | DIASTOLIC BLOOD PRESSURE: 80 MMHG | TEMPERATURE: 98.3 F | HEART RATE: 70 BPM | HEIGHT: 75 IN

## 2021-02-24 DIAGNOSIS — I10 BENIGN ESSENTIAL HYPERTENSION: Primary | ICD-10-CM

## 2021-02-24 DIAGNOSIS — G47.33 OSA (OBSTRUCTIVE SLEEP APNEA): ICD-10-CM

## 2021-02-24 DIAGNOSIS — E66.812 CLASS 2 OBESITY DUE TO EXCESS CALORIES WITHOUT SERIOUS COMORBIDITY WITH BODY MASS INDEX (BMI) OF 39.0 TO 39.9 IN ADULT: ICD-10-CM

## 2021-02-24 DIAGNOSIS — E66.09 CLASS 2 OBESITY DUE TO EXCESS CALORIES WITHOUT SERIOUS COMORBIDITY WITH BODY MASS INDEX (BMI) OF 39.0 TO 39.9 IN ADULT: ICD-10-CM

## 2021-02-24 PROCEDURE — 99214 OFFICE O/P EST MOD 30 MIN: CPT | Performed by: PHYSICIAN ASSISTANT

## 2021-02-24 RX ORDER — LISINOPRIL 20 MG/1
20 TABLET ORAL DAILY
Qty: 60 TABLET | Refills: 0 | Status: SHIPPED | OUTPATIENT
Start: 2021-02-24 | End: 2021-04-26

## 2021-02-24 ASSESSMENT — MIFFLIN-ST. JEOR: SCORE: 2400.06

## 2021-02-24 NOTE — PROGRESS NOTES
"Assessment & Plan     Benign essential hypertension  BP is not well controlled.   Continue on hydrochlorothiazide 25 mg daily  Added on Lisinopril 20 mg daily (mother also one).  Discussed potential side effects.   Repeat BP in 2 weeks and labs as well.    Will complete and fax form once BP controlled.   - lisinopril (ZESTRIL) 20 MG tablet; Take 1 tablet (20 mg) by mouth daily  - **Basic metabolic panel FUTURE 14d; Future    Class 2 obesity due to excess calories without serious comorbidity with body mass index (BMI) of 39.0 to 39.9 in adult  Discussed lifestyle modifications, he is working on this.     DEDRA (obstructive sleep apnea)  Continue use of CPAP.   Will send form now with the 30 days of readings (obtained by Dr. Webb's nurse), and will remind to get readings again at 90 and refax.  Hopefully this will help him get approved in the short-term.     23 minutes spent on the date of the encounter doing chart review, history and exam, documentation and further activities as noted above       BMI:   Estimated body mass index is 39.28 kg/m  as calculated from the following:    Height as of this encounter: 1.895 m (6' 2.61\").    Weight as of this encounter: 141.1 kg (311 lb).   Weight management plan: Discussed healthy diet and exercise guidelines        Return in about 2 weeks (around 3/10/2021) for Lab Work, BP Recheck.    Barbara Garcia PA-C  [facility]    Sailaja Broderick is a 40 year old who presents for the following health issues     HPI     Hypertension Follow-up    Do you check your blood pressure regularly outside of the clinic? 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 158/110 rechecked and it was 142/102. He took his mediation around 7am this am    Are you following a low salt diet? Yes - watches what he is eating.     Are your blood pressures ever more than 140 on the top number (systolic) OR more   than 90 on the bottom number (diastolic), for example 140/90? Yes " "- doesn't normally check other than his physical in December 2020. Started taking hydrochlorothiazide and is not reporting any side effects to the medications.     Family history of hypertension, mother is on medication.     Patient reports that some days towards the evening intermittently he will have headache.   Denies chest pain, shortness of breath, lightheadedness/dizziness, heart palpitations.     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   New to using CPAP in the last month, changing mask, currently with nasal mask and going well, needs to get form sent verifying DEDRA and treatment compliance as well.     Review of Systems   Constitutional, HEENT, cardiovascular, pulmonary, gi and gu systems are negative, except as otherwise noted.      Objective    BP (!) 158/80   Pulse 70   Temp 98.3  F (36.8  C) (Temporal)   Resp 16   Ht 1.895 m (6' 2.61\")   Wt 141.1 kg (311 lb)   SpO2 97%   BMI 39.28 kg/m    Body mass index is 39.28 kg/m .  Physical Exam   GENERAL: healthy, alert and no distress  RESP: lungs clear to auscultation - no rales, rhonchi or wheezes  CV: regular rate and rhythm, normal S1 S2, no S3 or S4, no murmur, click or rub, no peripheral edema and peripheral pulses strong  MS: no gross musculoskeletal defects noted, no edema                "

## 2021-02-25 ENCOUNTER — DOCUMENTATION ONLY (OUTPATIENT)
Dept: SLEEP MEDICINE | Facility: CLINIC | Age: 41
End: 2021-02-25
Payer: COMMERCIAL

## 2021-02-25 NOTE — PROGRESS NOTES
30 DAY STM VISIT    Diagnostic AHI:  35.6 HST    Data only recheck     Assessment: Pt meeting objective benchmarks.     Action plan: pt to have 6 month STM visit  Patient has scheduled a follow up visit with Dr. Webb on 3/9/2021.   Device type: Auto-CPAP  PAP settings: CPAP min 5.0 cm  H20     CPAP max 15.0 cm  H20    95th% pressure 10.3 cm  H20      RESMED EPR level Setting: TWO    RESMED Soft response setting:  OFF  Mask type:  Nasal Mask  Objective measures: 14 day rolling measures      Compliance  71 %      Leak  5.3 lpm  last  upload      AHI 0.58   last  upload      Average number of minutes 336      Objective measure goal  Compliance   Goal >70%  Leak   Goal < 24 lpm  AHI  Goal < 5  Usage  Goal >240        Total time spent on accessing and interpreting remote patient PAP therapy data  10 minutes    Total time spent counseling, coaching  and reviewing PAP therapy data with patient  0 minutes     [id number] this call  [id number] no  at 3 or 14 day [facility]

## 2021-03-03 ENCOUNTER — ALLIED HEALTH/NURSE VISIT (OUTPATIENT)
Dept: FAMILY MEDICINE | Facility: OTHER | Age: 41
End: 2021-03-03
Payer: COMMERCIAL

## 2021-03-03 VITALS — HEART RATE: 66 BPM | DIASTOLIC BLOOD PRESSURE: 88 MMHG | SYSTOLIC BLOOD PRESSURE: 138 MMHG

## 2021-03-03 DIAGNOSIS — Z01.30 BP CHECK: Primary | ICD-10-CM

## 2021-03-03 DIAGNOSIS — I10 BENIGN ESSENTIAL HYPERTENSION: ICD-10-CM

## 2021-03-03 PROCEDURE — 99207 PR NO CHARGE NURSE ONLY: CPT

## 2021-03-03 NOTE — PROGRESS NOTES
Patient was unable to be located for visit.  Specimen was not collected.  Nancy Hurtado CMA([facility])

## 2021-03-03 NOTE — PROGRESS NOTES
Davie Honeycutt is a 40 year old patient who comes in today for a Blood Pressure check.  Initial BP:  /88   Pulse 66      Patient states pressure at home are consistently 140/90  Disposition: follow-up as previously indicated by provider

## 2021-03-05 ENCOUNTER — NURSE TRIAGE (OUTPATIENT)
Dept: FAMILY MEDICINE | Facility: OTHER | Age: 41
End: 2021-03-05

## 2021-03-05 NOTE — TELEPHONE ENCOUNTER
I have the print off of 30 days but he needs 90 days so I have a reminder set to get the CPAP print off of 90 days once that is available.     For his BP.  He needs another BP check for this form.  He can have lab drawn when he has this checked and then will fax off forms.     Barbara Garcia PA-C

## 2021-03-05 NOTE — TELEPHONE ENCOUNTER
Patient was calling with questions about recent nurse visit for his BP.    Discussed most recent office visit note recommending follow up for lab draw as well as blood pressure check after starting new medication. He did have blood pressure check only visit two days ago, but did not have labs drawn.    Recommended per office note to schedule lab appointment for next Wednesday with BP re-check. Transferred to scheduling to schedule these.    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 Routing to provider for this.    Tony Aranda, RN, BSN

## 2021-03-08 NOTE — PROGRESS NOTES
Does Davie have a CPAP/Bipap?  Yes   Type of mask: Nasal Mask     [facility]: [place] [phone]    [email or web address]     [place] Sleep Scale: 4  Davie is a 40 year old who is being evaluated via a billable telephone visit.      What phone number would you like to be contacted at?   [phone]   How would you like to obtain your AVS? Mail a copy        Subjective   Davie is a 40 year old who presents for the following health issues CPAP follow up     Vitals:  No vitals were obtained today due to virtual visit.    Phone call duration: 15 minutes    Obstructive Sleep Apnea - PAP Follow-Up Visit:    Chief Complaint   Patient presents with     CPAP Follow Up       Davie Honeycutt comes in today for follow-up of their severe sleep apnea, managed with CPAP.     No specialty comments available.    Overall, he rates the experience with PAP as 10 (0 poor, 10 great). The mask is comfortable.   The mask is not leaking .  He is not snoring with the mask on. He is not having gasp arousals.  He is not having significant oral/nasal dryness. The pressure is comfortable.     His PAP interface is Nasal Mask.    The patient is usually getting 8 hours of sleep per night.    He does feel rested in the morning.    Total score - [place]: 2 (1/21/2021  2:00 PM)    That is down from 12 before therapy for severe DEDRA.  No data recorded    ResMed     Auto-PAP 5.0 - 15.0 cmH2O 30 day usage data:    80% of days with > 4 hours of use. 5/30 days with no use.   Average use 344 minutes per day.   95%ile Leak 6.09 L/min.   CPAP 95% pressure 10.3 cm.   AHI 0.56 events per hour.         Past medical/surgical history, family history, social history, medications and allergies were reviewed.      Problem List:  Patient Active Problem List    Diagnosis Date Noted     DEDRA (obstructive sleep apnea) 02/24/2021     Priority: Medium     Rosacea 12/22/2020     Priority: Medium     Class 2 obesity due to excess  calories without serious comorbidity with body mass index (BMI) of 39.0 to 39.9 in adult 11/30/2020     Priority: Medium     Benign essential hypertension 08/26/2011     Priority: Medium        There were no vitals taken for this visit.    Impression/Plan:  Patient is doing very well after initiation of CPAP will see obstructive sleep apnea and sleep associated hypoxemia.  He is optimally titrated currently.  Severe Sleep apnea.  Tolerating PAP well. Daytime symptoms are improved.       Davie Honeycutt will follow up in about 1 year(s).     Fifteen minutes spent with patient, all of which were spent in virtual counseling, consulting, coordinating plan of care.      CC:  Jarod Navarrete Oleg Froymovich, MD

## 2021-03-08 NOTE — TELEPHONE ENCOUNTER
Called and spoke with patient regarding message below.  Patient verbalized understanding.  Marry Newman CMA ([facility])

## 2021-03-09 ENCOUNTER — VIRTUAL VISIT (OUTPATIENT)
Dept: SLEEP MEDICINE | Facility: CLINIC | Age: 41
End: 2021-03-09
Payer: COMMERCIAL

## 2021-03-09 DIAGNOSIS — G47.33 OSA (OBSTRUCTIVE SLEEP APNEA): Primary | ICD-10-CM

## 2021-03-09 PROCEDURE — 99212 OFFICE O/P EST SF 10 MIN: CPT | Mod: 95 | Performed by: OTOLARYNGOLOGY

## 2021-03-10 ENCOUNTER — ALLIED HEALTH/NURSE VISIT (OUTPATIENT)
Dept: FAMILY MEDICINE | Facility: OTHER | Age: 41
End: 2021-03-10
Payer: COMMERCIAL

## 2021-03-10 VITALS — HEART RATE: 80 BPM | DIASTOLIC BLOOD PRESSURE: 87 MMHG | SYSTOLIC BLOOD PRESSURE: 129 MMHG

## 2021-03-10 DIAGNOSIS — I10 BENIGN ESSENTIAL HYPERTENSION: ICD-10-CM

## 2021-03-10 DIAGNOSIS — I10 HTN (HYPERTENSION): Primary | ICD-10-CM

## 2021-03-10 LAB
ANION GAP SERPL CALCULATED.3IONS-SCNC: 4 MMOL/L (ref 3–14)
BUN SERPL-MCNC: 13 MG/DL (ref 7–30)
CALCIUM SERPL-MCNC: 9.3 MG/DL (ref 8.5–10.1)
CHLORIDE SERPL-SCNC: 105 MMOL/L (ref 94–109)
CO2 SERPL-SCNC: 28 MMOL/L (ref 20–32)
CREAT SERPL-MCNC: 0.76 MG/DL (ref 0.66–1.25)
GFR SERPL CREATININE-BSD FRML MDRD: >90 ML/MIN/{1.73_M2}
GLUCOSE SERPL-MCNC: 92 MG/DL (ref 70–99)
POTASSIUM SERPL-SCNC: 4.2 MMOL/L (ref 3.4–5.3)
SODIUM SERPL-SCNC: 137 MMOL/L (ref 133–144)

## 2021-03-10 PROCEDURE — 80048 BASIC METABOLIC PNL TOTAL CA: CPT | Performed by: PHYSICIAN ASSISTANT

## 2021-03-10 PROCEDURE — 36415 COLL VENOUS BLD VENIPUNCTURE: CPT | Performed by: PHYSICIAN ASSISTANT

## 2021-03-10 PROCEDURE — 99207 PR NO CHARGE NURSE ONLY: CPT

## 2021-03-24 DIAGNOSIS — I10 BENIGN ESSENTIAL HYPERTENSION: ICD-10-CM

## 2021-03-24 RX ORDER — HYDROCHLOROTHIAZIDE 25 MG/1
25 TABLET ORAL DAILY
Qty: 90 TABLET | Refills: 0 | Status: SHIPPED | OUTPATIENT
Start: 2021-03-24 | End: 2021-06-28

## 2021-03-24 NOTE — TELEPHONE ENCOUNTER
Reason for Call:  Medication or medication refill:    Do you use a [facility]?  Name of the pharmacy and phone number for the current request:  [facility] - [phone]    Name of the medication requested: hydrochlorothiazide (HYDRODIURIL) 25 MG tablet    Other request: pt is out, does not have any pills for tomorrow        Call taken on 3/24/2021 at 10:26 AM by Halie Ponce

## 2021-04-24 DIAGNOSIS — I10 BENIGN ESSENTIAL HYPERTENSION: ICD-10-CM

## 2021-04-26 RX ORDER — LISINOPRIL 20 MG/1
TABLET ORAL
Qty: 180 TABLET | Refills: 1 | Status: SHIPPED | OUTPATIENT
Start: 2021-04-26 | End: 2021-11-01

## 2021-04-26 NOTE — TELEPHONE ENCOUNTER
Prescription approved per [facility] Refill Protocol.    Yuly Overton RN on 4/26/2021 at 3:48 PM

## 2021-06-26 DIAGNOSIS — I10 BENIGN ESSENTIAL HYPERTENSION: ICD-10-CM

## 2021-06-28 RX ORDER — HYDROCHLOROTHIAZIDE 25 MG/1
TABLET ORAL
Qty: 90 TABLET | Refills: 2 | Status: SHIPPED | OUTPATIENT
Start: 2021-06-28 | End: 2022-04-13

## 2021-08-03 ENCOUNTER — DOCUMENTATION ONLY (OUTPATIENT)
Dept: SLEEP MEDICINE | Facility: CLINIC | Age: 41
End: 2021-08-03
Payer: COMMERCIAL

## 2021-08-03 NOTE — PROGRESS NOTES
6 month [facility]    STM Recheck Visit     Diagnostic AHI:  35.6 HST    Data only recheck     Assessment: Pt meeting objective benchmarks.     Action plan:   pt to follow up per provider request       Device type: Auto-CPAP  PAP settings: CPAP min 5.0 cm  H20     CPAP max 15.0 cm  H20    95th% pressure 10.9 cm  H20   Objective measures: 14 day rolling measures      Compliance  71 %      Leak  12.65 lpm  last  upload      AHI 0.43   last  upload      Average number of minutes 286      Objective measure goal  Compliance   Goal >70%  Leak   Goal < 24 lpm  AHI  Goal < 5  Usage  Goal >240    Total time spent on accessing, reviewing and interpreting remote patient PAP therapy data:   10 minutes      Total time spent with direct patient communication :   0 minutes

## 2021-10-22 ENCOUNTER — OFFICE VISIT (OUTPATIENT)
Dept: FAMILY MEDICINE | Facility: CLINIC | Age: 41
End: 2021-10-22
Payer: COMMERCIAL

## 2021-10-22 VITALS
OXYGEN SATURATION: 98 % | SYSTOLIC BLOOD PRESSURE: 152 MMHG | RESPIRATION RATE: 16 BRPM | TEMPERATURE: 97.6 F | WEIGHT: 294.5 LBS | HEART RATE: 122 BPM | DIASTOLIC BLOOD PRESSURE: 104 MMHG | BODY MASS INDEX: 37.2 KG/M2

## 2021-10-22 DIAGNOSIS — I10 BENIGN ESSENTIAL HYPERTENSION: ICD-10-CM

## 2021-10-22 DIAGNOSIS — R10.13 ABDOMINAL PAIN, EPIGASTRIC: ICD-10-CM

## 2021-10-22 DIAGNOSIS — K85.10 ACUTE BILIARY PANCREATITIS, UNSPECIFIED COMPLICATION STATUS: Primary | ICD-10-CM

## 2021-10-22 LAB
ALBUMIN SERPL-MCNC: 3.7 G/DL (ref 3.4–5)
ALBUMIN UR-MCNC: >499 MG/DL
ALP SERPL-CCNC: 98 U/L (ref 40–150)
ALT SERPL W P-5'-P-CCNC: 42 U/L (ref 0–70)
ANION GAP SERPL CALCULATED.3IONS-SCNC: 8 MMOL/L (ref 3–14)
APPEARANCE UR: ABNORMAL
AST SERPL W P-5'-P-CCNC: 15 U/L (ref 0–45)
BACTERIA #/AREA URNS HPF: ABNORMAL /HPF
BASOPHILS # BLD AUTO: 0 10E3/UL (ref 0–0.2)
BASOPHILS NFR BLD AUTO: 0 %
BILIRUB SERPL-MCNC: 2.1 MG/DL (ref 0.2–1.3)
BILIRUB UR QL STRIP: ABNORMAL
BUN SERPL-MCNC: 13 MG/DL (ref 7–30)
CALCIUM SERPL-MCNC: 9 MG/DL (ref 8.5–10.1)
CHLORIDE BLD-SCNC: 101 MMOL/L (ref 94–109)
CO2 SERPL-SCNC: 26 MMOL/L (ref 20–32)
COLOR UR AUTO: ABNORMAL
CREAT SERPL-MCNC: 0.88 MG/DL (ref 0.66–1.25)
EOSINOPHIL # BLD AUTO: 0.2 10E3/UL (ref 0–0.7)
EOSINOPHIL NFR BLD AUTO: 1 %
ERYTHROCYTE [DISTWIDTH] IN BLOOD BY AUTOMATED COUNT: 13.2 % (ref 10–15)
GFR SERPL CREATININE-BSD FRML MDRD: >90 ML/MIN/1.73M2
GLUCOSE BLD-MCNC: 123 MG/DL (ref 70–99)
GLUCOSE UR STRIP-MCNC: NEGATIVE MG/DL
HCT VFR BLD AUTO: 47.3 % (ref 40–53)
HGB BLD-MCNC: 16.1 G/DL (ref 13.3–17.7)
HGB UR QL STRIP: ABNORMAL
IMM GRANULOCYTES # BLD: 0.1 10E3/UL
IMM GRANULOCYTES NFR BLD: 1 %
KETONES UR STRIP-MCNC: 20 MG/DL
LEUKOCYTE ESTERASE UR QL STRIP: NEGATIVE
LIPASE SERPL-CCNC: 410 U/L (ref 73–393)
LYMPHOCYTES # BLD AUTO: 0.9 10E3/UL (ref 0.8–5.3)
LYMPHOCYTES NFR BLD AUTO: 5 %
MCH RBC QN AUTO: 29.1 PG (ref 26.5–33)
MCHC RBC AUTO-ENTMCNC: 34 G/DL (ref 31.5–36.5)
MCV RBC AUTO: 85 FL (ref 78–100)
MONOCYTES # BLD AUTO: 1.4 10E3/UL (ref 0–1.3)
MONOCYTES NFR BLD AUTO: 8 %
MUCOUS THREADS #/AREA URNS LPF: PRESENT /LPF
NEUTROPHILS # BLD AUTO: 15.8 10E3/UL (ref 1.6–8.3)
NEUTROPHILS NFR BLD AUTO: 85 %
NITRATE UR QL: NEGATIVE
NRBC # BLD AUTO: 0 10E3/UL
NRBC BLD AUTO-RTO: 0 /100
PH UR STRIP: 5 [PH] (ref 5–7)
PLATELET # BLD AUTO: 214 10E3/UL (ref 150–450)
POTASSIUM BLD-SCNC: 3.3 MMOL/L (ref 3.4–5.3)
PROT SERPL-MCNC: 8.9 G/DL (ref 6.8–8.8)
RBC # BLD AUTO: 5.54 10E6/UL (ref 4.4–5.9)
RBC URINE: 4 /HPF
SODIUM SERPL-SCNC: 135 MMOL/L (ref 133–144)
SP GR UR STRIP: 1.03 (ref 1–1.03)
SQUAMOUS EPITHELIAL: 1 /HPF
UROBILINOGEN UR STRIP-MCNC: 4 MG/DL
WBC # BLD AUTO: 18.5 10E3/UL (ref 4–11)
WBC URINE: 3 /HPF

## 2021-10-22 PROCEDURE — 36415 COLL VENOUS BLD VENIPUNCTURE: CPT | Performed by: FAMILY MEDICINE

## 2021-10-22 PROCEDURE — 85025 COMPLETE CBC W/AUTO DIFF WBC: CPT | Performed by: FAMILY MEDICINE

## 2021-10-22 PROCEDURE — 80053 COMPREHEN METABOLIC PANEL: CPT | Performed by: FAMILY MEDICINE

## 2021-10-22 PROCEDURE — 83690 ASSAY OF LIPASE: CPT | Performed by: FAMILY MEDICINE

## 2021-10-22 PROCEDURE — 99215 OFFICE O/P EST HI 40 MIN: CPT | Performed by: FAMILY MEDICINE

## 2021-10-22 PROCEDURE — 81001 URINALYSIS AUTO W/SCOPE: CPT | Performed by: FAMILY MEDICINE

## 2021-10-22 RX ORDER — ONDANSETRON 4 MG/1
4 TABLET, FILM COATED ORAL EVERY 8 HOURS PRN
Qty: 12 TABLET | Refills: 0 | Status: SHIPPED | OUTPATIENT
Start: 2021-10-22 | End: 2021-11-01

## 2021-10-22 RX ORDER — LISINOPRIL 10 MG/1
10 TABLET ORAL DAILY
Qty: 30 TABLET | Refills: 3 | Status: SHIPPED | OUTPATIENT
Start: 2021-10-22 | End: 2022-10-28 | Stop reason: DRUGHIGH

## 2021-10-22 RX ORDER — OXYCODONE AND ACETAMINOPHEN 5; 325 MG/1; MG/1
1 TABLET ORAL EVERY 6 HOURS PRN
Qty: 12 TABLET | Refills: 0 | Status: SHIPPED | OUTPATIENT
Start: 2021-10-22 | End: 2021-10-25

## 2021-10-22 ASSESSMENT — PAIN SCALES - GENERAL: PAINLEVEL: SEVERE PAIN (6)

## 2021-10-22 NOTE — PATIENT INSTRUCTIONS
Report to emergency room if symptoms worsen over the next 24 to 48 hours. Including development of fever vomiting increased pain or food and liquid intolerance.    Ultrasound scheduled for Tuesday at 2:25 PM 8-hour fast ahead of this with just sips of water only

## 2021-10-25 ENCOUNTER — HOSPITAL ENCOUNTER (OUTPATIENT)
Dept: ULTRASOUND IMAGING | Facility: CLINIC | Age: 41
Discharge: HOME | End: 2021-10-25
Attending: FAMILY MEDICINE | Admitting: FAMILY MEDICINE
Payer: COMMERCIAL

## 2021-10-25 DIAGNOSIS — R10.13 ABDOMINAL PAIN, EPIGASTRIC: ICD-10-CM

## 2021-10-25 PROCEDURE — 76705 ECHO EXAM OF ABDOMEN: CPT

## 2021-10-28 NOTE — PROGRESS NOTES
Assessment & Plan     Acute biliary pancreatitis, unspecified complication status  For most part all labs are now normalized.  His one liver function test is just slightly elevated but not concerning at this time. If pancreatitis were to recur again then consider evaluation with GI.  Discussed may have been triggered by alcohol will so need to monitor his alcohol intake and if symptoms recur.   - Comprehensive metabolic panel (BMP + Alb, Alk Phos, ALT, AST, Total. Bili, TP); Future  - CBC with platelets and differential; Future  - Lipase; Future  - Lipase  - CBC with platelets and differential  - Comprehensive metabolic panel (BMP + Alb, Alk Phos, ALT, AST, Total. Bili, TP)    Need for hepatitis C screening test  Screening due.   - Hepatitis C Screen Reflex to HCV RNA Quant and Genotype; Future  - Hepatitis C Screen Reflex to HCV RNA Quant and Genotype    Benign essential hypertension  His BP is well controlled today.     DEDRA (obstructive sleep apnea)  We discussed importance of continued use for now. He has lost weight and if he can continue to lose weigh the may be able to discontinue but we will not know until that time.     Screening for hyperlipidemia  Lipids are good.   The 10-year ASCVD risk score (Jose Alberto DC Jr., et al., 2013) is: 1%    Values used to calculate the score:      Age: 41 years      Sex: Male      Is Non- : No      Diabetic: No      Tobacco smoker: No      Systolic Blood Pressure: 122 mmHg      Is BP treated: Yes      HDL Cholesterol: 42 mg/dL      Total Cholesterol: 154 mg/dL  - Lipid panel reflex to direct LDL Fasting; Future  - Lipid panel reflex to direct LDL Fasting    Prediabetes  His A1c is relatively stable but does still have prediabetes, discussed diet modifications to help lower.   - Hemoglobin A1c; Future  - Hemoglobin A1c    Morbid obesity (H)  Discussed weight management.     Influenza vaccination declined by patient  COVID-19 vaccination declined      Return  in about 6 months (around 5/1/2022) for Physical Exam, Lab Work, Medication Re-check.    Options for treatment and follow-up care were reviewed with the patient and/or guardian. Patient and/or guardian engaged in the decision making process and verbalized understanding of the options discussed and agreed with the final plan.    [facility] ABHISHEK Broderick is a 41 year old who presents for the following health issues     History of Present Illness       He eats 2-3 servings of fruits and vegetables daily.He consumes 1 sweetened beverage(s) daily.He exercises with enough effort to increase his heart rate 20 to 29 minutes per day.  He exercises with enough effort to increase his heart rate 3 or less days per week.   He is taking medications regularly.       Concern -  Patient had visit on 10/22 for concerns about pancreatitis. That provider ordered labs and imaging for the patient to complete and he did that. He is currently not having any symptoms of abdominal pain at this time. He has not heard back for either one of these which brings him here today.     Started Tuesday prior to going to the ER, first started as slight abdominal pain and then felt more painful, nauseated.  His abdomen seemed hard and distended.  Wasn't eating/drinking because of the pain.  Then scheduled to be seen.   Was seen on 10/22/2021.  Symptoms had been improving at that point so they just did lab work and treated conservatively.     He doesn't recall the circumstances around his first episode of pancreatitis. He says that he has cut back on drinking since we talked about his elevated BP in the past.  He does still drink and recently they sold their town home and he had > 8 drinks two nights in a row and that was a few days prior to the onset of his symptoms.     He doesn't recall any new supplements but takes a daily multivitamin.  No changes in his medications or medication doses.      He  had ultrasound done this last time which showed no signs of biliary obstruction. He had a CT done which showed previous pancreatitis a year ago and again no obstructive process obvious and no signs of pancreatic mass.     Review of Systems   Constitutional, HEENT, cardiovascular, pulmonary, gi and gu systems are negative, except as otherwise noted.      Objective    /80   Pulse 65   Temp 98.2  F (36.8  C) (Temporal)   Resp 18   Wt 135.2 kg (298 lb)   SpO2 99%   BMI 37.64 kg/m    Body mass index is 37.64 kg/m .  Physical Exam   GENERAL: healthy, alert and no distress  NECK: no adenopathy, no asymmetry, masses, or scars and thyroid normal to palpation  RESP: lungs clear to auscultation - no rales, rhonchi or wheezes  CV: regular rate and rhythm, normal S1 S2, no S3 or S4, no murmur, click or rub, no peripheral edema and peripheral pulses strong  ABDOMEN: soft, nontender, no hepatosplenomegaly, no masses and bowel sounds normal  MS: no gross musculoskeletal defects noted, no edema  PSYCH: mentation appears normal, affect normal/bright    Results for orders placed or performed in visit on 11/01/21   Hemoglobin A1c     Status: Abnormal   Result Value Ref Range    Hemoglobin A1C 5.9 (H) 0.0 - 5.6 %   Lipid panel reflex to direct LDL Fasting     Status: None   Result Value Ref Range    Cholesterol 154 <200 mg/dL    Triglycerides 120 <150 mg/dL    Direct Measure HDL 42 >=40 mg/dL    LDL Cholesterol Calculated 88 <=100 mg/dL    Non HDL Cholesterol 112 <130 mg/dL    Patient Fasting > 8hrs? No     Narrative    Cholesterol  Desirable:  <200 mg/dL    Triglycerides  Normal:  Less than 150 mg/dL  Borderline High:  150-199 mg/dL  High:  200-499 mg/dL  Very High:  Greater than or equal to 500 mg/dL    Direct Measure HDL  Female:  Greater than or equal to 50 mg/dL   Male:  Greater than or equal to 40 mg/dL    LDL Cholesterol  Desirable:  <100mg/dL  Above Desirable:  100-129 mg/dL   Borderline High:  130-159 mg/dL   High:   160-189 mg/dL   Very High:  >= 190 mg/dL    Non HDL Cholesterol  Desirable:  130 mg/dL  Above Desirable:  130-159 mg/dL  Borderline High:  160-189 mg/dL  High:  190-219 mg/dL  Very High:  Greater than or equal to 220 mg/dL   Lipase     Status: Normal   Result Value Ref Range    Lipase 189 73 - 393 U/L   Comprehensive metabolic panel (BMP + Alb, Alk Phos, ALT, AST, Total. Bili, TP)     Status: Abnormal   Result Value Ref Range    Sodium 138 133 - 144 mmol/L    Potassium 4.7 3.4 - 5.3 mmol/L    Chloride 104 94 - 109 mmol/L    Carbon Dioxide (CO2) 30 20 - 32 mmol/L    Anion Gap 4 3 - 14 mmol/L    Urea Nitrogen 21 7 - 30 mg/dL    Creatinine 0.80 0.66 - 1.25 mg/dL    Calcium 9.1 8.5 - 10.1 mg/dL    Glucose 104 (H) 70 - 99 mg/dL    Alkaline Phosphatase 94 40 - 150 U/L    AST 24 0 - 45 U/L    ALT 78 (H) 0 - 70 U/L    Protein Total 7.8 6.8 - 8.8 g/dL    Albumin 3.6 3.4 - 5.0 g/dL    Bilirubin Total 0.3 0.2 - 1.3 mg/dL    GFR Estimate >90 >60 mL/min/1.73m2   CBC with platelets and differential     Status: None   Result Value Ref Range    WBC Count 6.9 4.0 - 11.0 10e3/uL    RBC Count 4.89 4.40 - 5.90 10e6/uL    Hemoglobin 14.0 13.3 - 17.7 g/dL    Hematocrit 43.2 40.0 - 53.0 %    MCV 88 78 - 100 fL    MCH 28.6 26.5 - 33.0 pg    MCHC 32.4 31.5 - 36.5 g/dL    RDW 13.5 10.0 - 15.0 %    Platelet Count 269 150 - 450 10e3/uL    % Neutrophils 64 %    % Lymphocytes 24 %    % Monocytes 8 %    % Eosinophils 4 %    % Basophils 0 %    Absolute Neutrophils 4.4 1.6 - 8.3 10e3/uL    Absolute Lymphocytes 1.6 0.8 - 5.3 10e3/uL    Absolute Monocytes 0.6 0.0 - 1.3 10e3/uL    Absolute Eosinophils 0.3 0.0 - 0.7 10e3/uL    Absolute Basophils 0.0 0.0 - 0.2 10e3/uL   CBC with platelets and differential     Status: None    Narrative    The following orders were created for panel order CBC with platelets and differential.  Procedure                               Abnormality         Status                     ---------                                -----------         ------                     CBC with platelets and d...[[id number]]                      Final result                 Please view results for these tests on the individual orders.

## 2021-11-01 ENCOUNTER — OFFICE VISIT (OUTPATIENT)
Dept: FAMILY MEDICINE | Facility: OTHER | Age: 41
End: 2021-11-01
Payer: COMMERCIAL

## 2021-11-01 VITALS
TEMPERATURE: 98.2 F | DIASTOLIC BLOOD PRESSURE: 80 MMHG | RESPIRATION RATE: 18 BRPM | WEIGHT: 298 LBS | HEART RATE: 65 BPM | OXYGEN SATURATION: 99 % | BODY MASS INDEX: 37.64 KG/M2 | SYSTOLIC BLOOD PRESSURE: 122 MMHG

## 2021-11-01 DIAGNOSIS — E66.01 MORBID OBESITY (H): ICD-10-CM

## 2021-11-01 DIAGNOSIS — Z13.220 SCREENING FOR HYPERLIPIDEMIA: ICD-10-CM

## 2021-11-01 DIAGNOSIS — R73.03 PREDIABETES: ICD-10-CM

## 2021-11-01 DIAGNOSIS — Z28.21 INFLUENZA VACCINATION DECLINED BY PATIENT: ICD-10-CM

## 2021-11-01 DIAGNOSIS — G47.33 OSA (OBSTRUCTIVE SLEEP APNEA): ICD-10-CM

## 2021-11-01 DIAGNOSIS — Z11.59 NEED FOR HEPATITIS C SCREENING TEST: ICD-10-CM

## 2021-11-01 DIAGNOSIS — K85.10 ACUTE BILIARY PANCREATITIS, UNSPECIFIED COMPLICATION STATUS: Primary | ICD-10-CM

## 2021-11-01 DIAGNOSIS — I10 BENIGN ESSENTIAL HYPERTENSION: ICD-10-CM

## 2021-11-01 DIAGNOSIS — Z28.21 COVID-19 VACCINATION DECLINED: ICD-10-CM

## 2021-11-01 LAB
ALBUMIN SERPL-MCNC: 3.6 G/DL (ref 3.4–5)
ALP SERPL-CCNC: 94 U/L (ref 40–150)
ALT SERPL W P-5'-P-CCNC: 78 U/L (ref 0–70)
ANION GAP SERPL CALCULATED.3IONS-SCNC: 4 MMOL/L (ref 3–14)
AST SERPL W P-5'-P-CCNC: 24 U/L (ref 0–45)
BASOPHILS # BLD AUTO: 0 10E3/UL (ref 0–0.2)
BASOPHILS NFR BLD AUTO: 0 %
BILIRUB SERPL-MCNC: 0.3 MG/DL (ref 0.2–1.3)
BUN SERPL-MCNC: 21 MG/DL (ref 7–30)
CALCIUM SERPL-MCNC: 9.1 MG/DL (ref 8.5–10.1)
CHLORIDE BLD-SCNC: 104 MMOL/L (ref 94–109)
CHOLEST SERPL-MCNC: 154 MG/DL
CO2 SERPL-SCNC: 30 MMOL/L (ref 20–32)
CREAT SERPL-MCNC: 0.8 MG/DL (ref 0.66–1.25)
EOSINOPHIL # BLD AUTO: 0.3 10E3/UL (ref 0–0.7)
EOSINOPHIL NFR BLD AUTO: 4 %
ERYTHROCYTE [DISTWIDTH] IN BLOOD BY AUTOMATED COUNT: 13.5 % (ref 10–15)
FASTING STATUS PATIENT QL REPORTED: NO
GFR SERPL CREATININE-BSD FRML MDRD: >90 ML/MIN/1.73M2
GLUCOSE BLD-MCNC: 104 MG/DL (ref 70–99)
HBA1C MFR BLD: 5.9 % (ref 0–5.6)
HCT VFR BLD AUTO: 43.2 % (ref 40–53)
HDLC SERPL-MCNC: 42 MG/DL
HGB BLD-MCNC: 14 G/DL (ref 13.3–17.7)
LDLC SERPL CALC-MCNC: 88 MG/DL
LIPASE SERPL-CCNC: 189 U/L (ref 73–393)
LYMPHOCYTES # BLD AUTO: 1.6 10E3/UL (ref 0.8–5.3)
LYMPHOCYTES NFR BLD AUTO: 24 %
MCH RBC QN AUTO: 28.6 PG (ref 26.5–33)
MCHC RBC AUTO-ENTMCNC: 32.4 G/DL (ref 31.5–36.5)
MCV RBC AUTO: 88 FL (ref 78–100)
MONOCYTES # BLD AUTO: 0.6 10E3/UL (ref 0–1.3)
MONOCYTES NFR BLD AUTO: 8 %
NEUTROPHILS # BLD AUTO: 4.4 10E3/UL (ref 1.6–8.3)
NEUTROPHILS NFR BLD AUTO: 64 %
NONHDLC SERPL-MCNC: 112 MG/DL
PLATELET # BLD AUTO: 269 10E3/UL (ref 150–450)
POTASSIUM BLD-SCNC: 4.7 MMOL/L (ref 3.4–5.3)
PROT SERPL-MCNC: 7.8 G/DL (ref 6.8–8.8)
RBC # BLD AUTO: 4.89 10E6/UL (ref 4.4–5.9)
SODIUM SERPL-SCNC: 138 MMOL/L (ref 133–144)
TRIGL SERPL-MCNC: 120 MG/DL
WBC # BLD AUTO: 6.9 10E3/UL (ref 4–11)

## 2021-11-01 PROCEDURE — 80053 COMPREHEN METABOLIC PANEL: CPT | Performed by: PHYSICIAN ASSISTANT

## 2021-11-01 PROCEDURE — 99214 OFFICE O/P EST MOD 30 MIN: CPT | Performed by: PHYSICIAN ASSISTANT

## 2021-11-01 PROCEDURE — 86803 HEPATITIS C AB TEST: CPT | Performed by: PHYSICIAN ASSISTANT

## 2021-11-01 PROCEDURE — 83036 HEMOGLOBIN GLYCOSYLATED A1C: CPT | Performed by: PHYSICIAN ASSISTANT

## 2021-11-01 PROCEDURE — 83690 ASSAY OF LIPASE: CPT | Performed by: PHYSICIAN ASSISTANT

## 2021-11-01 PROCEDURE — 80061 LIPID PANEL: CPT | Performed by: PHYSICIAN ASSISTANT

## 2021-11-01 PROCEDURE — 85025 COMPLETE CBC W/AUTO DIFF WBC: CPT | Performed by: PHYSICIAN ASSISTANT

## 2021-11-01 PROCEDURE — 36415 COLL VENOUS BLD VENIPUNCTURE: CPT | Performed by: PHYSICIAN ASSISTANT

## 2021-11-02 PROBLEM — R73.03 PREDIABETES: Status: ACTIVE | Noted: 2021-11-02

## 2021-11-02 LAB — HCV AB SERPL QL IA: NONREACTIVE

## 2021-11-18 NOTE — TELEPHONE ENCOUNTER
Patient returned call - nurse relayed message and discussed low carb diet, losing weight, drinking 6 to 8 glasses of water per day, avoid sugars like soda. Patient verbalized understanding and agreement with plan and had no questions.                     Mirlande Dennison RN  Triage Nurse  [facility], 24 hour nurse line, available by calling clinic at [phone] and following prompts.             Well-controlled, continue current medications   Controlled Substance Monitoring:    Acute and Chronic Pain Monitoring:   RX Monitoring 11/18/2021   Attestation -   Periodic Controlled Substance Monitoring Possible medication side effects, risk of tolerance/dependence & alternative treatments discussed. ;No signs of potential drug abuse or diversion identified. ;Assessed functional status. Chronic Pain > 50 MEDD Obtained or confirmed \"Consent for Opioid Use\" on file.

## 2021-12-18 ENCOUNTER — HEALTH MAINTENANCE LETTER (OUTPATIENT)
Age: 41
End: 2021-12-18

## 2022-04-13 DIAGNOSIS — I10 BENIGN ESSENTIAL HYPERTENSION: ICD-10-CM

## 2022-04-13 RX ORDER — HYDROCHLOROTHIAZIDE 25 MG/1
TABLET ORAL
Qty: 90 TABLET | Refills: 0 | Status: SHIPPED | OUTPATIENT
Start: 2022-04-13 | End: 2022-09-15

## 2022-09-15 DIAGNOSIS — I10 BENIGN ESSENTIAL HYPERTENSION: ICD-10-CM

## 2022-09-15 RX ORDER — HYDROCHLOROTHIAZIDE 25 MG/1
TABLET ORAL
Qty: 30 TABLET | Refills: 0 | Status: SHIPPED | OUTPATIENT
Start: 2022-09-15 | End: 2022-10-28

## 2022-10-09 ENCOUNTER — HEALTH MAINTENANCE LETTER (OUTPATIENT)
Age: 42
End: 2022-10-09

## 2022-10-28 ENCOUNTER — OFFICE VISIT (OUTPATIENT)
Dept: FAMILY MEDICINE | Facility: OTHER | Age: 42
End: 2022-10-28
Payer: COMMERCIAL

## 2022-10-28 VITALS
RESPIRATION RATE: 18 BRPM | DIASTOLIC BLOOD PRESSURE: 80 MMHG | BODY MASS INDEX: 36.38 KG/M2 | WEIGHT: 288 LBS | HEART RATE: 65 BPM | OXYGEN SATURATION: 98 % | TEMPERATURE: 98.4 F | SYSTOLIC BLOOD PRESSURE: 140 MMHG

## 2022-10-28 DIAGNOSIS — E66.01 MORBID OBESITY (H): ICD-10-CM

## 2022-10-28 DIAGNOSIS — R73.03 PREDIABETES: ICD-10-CM

## 2022-10-28 DIAGNOSIS — I10 BENIGN ESSENTIAL HYPERTENSION: Primary | ICD-10-CM

## 2022-10-28 DIAGNOSIS — L71.9 ROSACEA: ICD-10-CM

## 2022-10-28 DIAGNOSIS — Z13.220 SCREENING FOR HYPERLIPIDEMIA: ICD-10-CM

## 2022-10-28 DIAGNOSIS — Z71.89 ADVANCED CARE PLANNING/COUNSELING DISCUSSION: ICD-10-CM

## 2022-10-28 PROCEDURE — 99214 OFFICE O/P EST MOD 30 MIN: CPT | Performed by: PHYSICIAN ASSISTANT

## 2022-10-28 RX ORDER — LISINOPRIL 20 MG/1
20 TABLET ORAL DAILY
Qty: 90 TABLET | Refills: 0 | Status: SHIPPED | OUTPATIENT
Start: 2022-10-28 | End: 2023-04-15

## 2022-10-28 RX ORDER — HYDROCHLOROTHIAZIDE 25 MG/1
25 TABLET ORAL DAILY
Qty: 90 TABLET | Refills: 0 | Status: SHIPPED | OUTPATIENT
Start: 2022-10-28 | End: 2022-10-28 | Stop reason: ALTCHOICE

## 2022-10-28 RX ORDER — METRONIDAZOLE 7.5 MG/G
GEL TOPICAL 2 TIMES DAILY
Qty: 45 G | Refills: 1 | Status: SHIPPED | OUTPATIENT
Start: 2022-10-28 | End: 2023-09-29

## 2022-10-28 ASSESSMENT — PAIN SCALES - GENERAL: PAINLEVEL: NO PAIN (0)

## 2022-10-28 NOTE — PROGRESS NOTES
Assessment & Plan     Benign essential hypertension  His BP is not controlled but he is not on medication today.   He does think he checked his BP while on hydrochlorothiazide alone and it was still in the 140's so we will stop the hydrochlorothiazide and start Lisinopril at 20 mg daily and recheck lab and BP in 2-4 weeks.   Hopefully with his diet changes he recently started this will result in better management of his BP, blood sugar.   - lisinopril (ZESTRIL) 20 MG tablet; Take 1 tablet (20 mg) by mouth daily  - Comprehensive metabolic panel (BMP + Alb, Alk Phos, ALT, AST, Total. Bili, TP); Future    Prediabetes  Will recheck , continue with recent diet changes.   - Comprehensive metabolic panel (BMP + Alb, Alk Phos, ALT, AST, Total. Bili, TP); Future  - Hemoglobin A1c; Future    Rosacea  Still issue but does resolve with use of Metrogel PRN.   - metroNIDAZOLE (METROGEL) 0.75 % external gel; Apply topically 2 times daily    Advanced care planning/counseling discussion  Discussed and given forms.     Screening for hyperlipidemia  Due for labs.   - Lipid panel reflex to direct LDL Fasting; Future    Morbid obesity (H)  See above regarding changes he is making. His HTN and Prediabets are a result of his obesity.     Will need Tdap as well at his visit with MA.   He declines COVID and influenza vaccination.     Return in about 2 weeks (around 11/11/2022) for BP Recheck, Lab Work.    Options for treatment and follow-up care were reviewed with the patient and/or guardian. Patient and/or guardian engaged in the decision making process and verbalized understanding of the options discussed and agreed with the final plan.    [facility] ABHISHEK Broderick is a 42 year old, presenting for the following health issues:  Hypertension      History of Present Illness       Hypertension: He presents for follow up of hypertension.  He does not check blood pressure  regularly  outside of the clinic. Outside blood pressures have been over 140/90. He does not follow a low salt diet.       - Ran out of medication just recently.   - He was only taking the hydrochlorothiazide medication.   - he has been working recently to change his diet. He is fasting in the AM, he is reducing processed foods and alcohol.      Review of Systems   Constitutional, HEENT, cardiovascular, pulmonary, gi and gu systems are negative, except as otherwise noted.      Objective    BP (!) 140/80   Pulse 65   Temp 98.4  F (36.9  C) (Temporal)   Resp 18   Wt 130.6 kg (288 lb)   SpO2 98%   BMI 36.38 kg/m    Body mass index is 36.38 kg/m .  Physical Exam   GENERAL: healthy, alert and no distress  EYES: Eyes grossly normal to inspection, PERRL and conjunctivae and sclerae normal  RESP: lungs clear to auscultation - no rales, rhonchi or wheezes  CV: regular rate and rhythm, normal S1 S2, no S3 or S4, no murmur, click or rub, no peripheral edema and peripheral pulses strong  MS: no gross musculoskeletal defects noted, no edema  NEURO: Normal strength and tone, mentation intact and speech normal  PSYCH: mentation appears normal, affect normal/bright

## 2022-11-11 ENCOUNTER — TELEPHONE (OUTPATIENT)
Dept: FAMILY MEDICINE | Facility: OTHER | Age: 42
End: 2022-11-11

## 2022-11-11 NOTE — TELEPHONE ENCOUNTER
Please call patient, schedule for nurse BP check, Tdap and lab appointment.     Barbara Garcia PA-C

## 2023-02-12 ENCOUNTER — HEALTH MAINTENANCE LETTER (OUTPATIENT)
Age: 43
End: 2023-02-12

## 2023-09-29 ENCOUNTER — OFFICE VISIT (OUTPATIENT)
Dept: FAMILY MEDICINE | Facility: OTHER | Age: 43
End: 2023-09-29
Payer: COMMERCIAL

## 2023-09-29 VITALS
TEMPERATURE: 98.4 F | RESPIRATION RATE: 16 BRPM | DIASTOLIC BLOOD PRESSURE: 110 MMHG | HEART RATE: 83 BPM | OXYGEN SATURATION: 97 % | SYSTOLIC BLOOD PRESSURE: 168 MMHG | WEIGHT: 315 LBS | BODY MASS INDEX: 40.43 KG/M2 | HEIGHT: 74 IN

## 2023-09-29 DIAGNOSIS — I10 BENIGN ESSENTIAL HYPERTENSION: Primary | ICD-10-CM

## 2023-09-29 DIAGNOSIS — R53.83 OTHER FATIGUE: ICD-10-CM

## 2023-09-29 DIAGNOSIS — M26.609 TMJ (TEMPOROMANDIBULAR JOINT SYNDROME): ICD-10-CM

## 2023-09-29 DIAGNOSIS — R73.03 PREDIABETES: ICD-10-CM

## 2023-09-29 DIAGNOSIS — E66.01 MORBID OBESITY (H): ICD-10-CM

## 2023-09-29 DIAGNOSIS — Z13.220 SCREENING FOR HYPERLIPIDEMIA: ICD-10-CM

## 2023-09-29 PROCEDURE — 99214 OFFICE O/P EST MOD 30 MIN: CPT | Performed by: PHYSICIAN ASSISTANT

## 2023-09-29 RX ORDER — LISINOPRIL 40 MG/1
40 TABLET ORAL DAILY
Qty: 90 TABLET | Refills: 0 | Status: SHIPPED | OUTPATIENT
Start: 2023-09-29 | End: 2023-12-28

## 2023-09-29 ASSESSMENT — PAIN SCALES - GENERAL: PAINLEVEL: MILD PAIN (3)

## 2023-09-29 NOTE — PROGRESS NOTES
Assessment & Plan     Benign essential hypertension  BP not well controlled, suspect he needs higher dose of Lisinopril.   Restarted on Lisinopril, started at 40 mg.   Will recheck BP and Labs in 2-4 weeks. Adjust accordingly.   - lisinopril (ZESTRIL) 40 MG tablet; Take 1 tablet (40 mg) by mouth daily  - Comprehensive metabolic panel (BMP + Alb, Alk Phos, ALT, AST, Total. Bili, TP); Future    Prediabetes  Will recheck to see if this has gotten worse  Consider medications for weight loss including GLP1 medications, also consider Metformin.   Had discussion about dietary changes that need to be made, he is aware that he needs to make these changes as well.   - Comprehensive metabolic panel (BMP + Alb, Alk Phos, ALT, AST, Total. Bili, TP); Future  - Hemoglobin A1c; Future    TMJ (temporomandibular joint syndrome)  Suspect TMJ, did encourage dental evaluation.   Will start with avoiding chewy foods and ice and once his BP is under control we can start Naproxen, Recommended searching for home physical therapy exercises for TMJ.     Other fatigue  We did discuss obesity and how this can lower testosterone.   Will check labs to make sure no concerns.   - Testosterone Free and Total; Future  - CBC with platelets; Future  - TSH with free T4 reflex; Future    Morbid obesity (H)  His obesity is direct cause for his prediabetes and Hypertension.   Discussed dietary and other lifestyle modifications that need to happen to help improve his chronic conditions and prevent additional concerns.     Screening for hyperlipidemia  Screening due.   - Lipid panel reflex to direct LDL Fasting; Future    Options for treatment and follow-up care were reviewed with the patient and/or guardian. Patient and/or guardian engaged in the decision making process and verbalized understanding of the options discussed and agreed with the final plan.      Barbara Garcia PA-C  [facility] ABHISHEK Broderick is a 43 year old,  "presenting for the following health issues:  Hypertension        9/29/2023     9:01 AM   Additional Questions   Roomed by NIKHIL   Accompanied by SUKH         9/29/2023     9:01 AM   Patient Reported Additional Medications   Patient reports taking the following new medications None       History of Present Illness       Hypertension: He presents for follow up of hypertension.  He does not check blood pressure  regularly outside of the clinic. Outside blood pressures have been over 140/90. He does not follow a low salt diet.     He eats 0-1 servings of fruits and vegetables daily.He consumes 1 sweetened beverage(s) daily.He exercises with enough effort to increase his heart rate 9 or less minutes per day.  He exercises with enough effort to increase his heart rate 3 or less days per week. He is missing 7 dose(s) of medications per week.       HTN:  - Currently off medication  - Admits to poor diet.  Eats pizza once per week. Eats red meat, but sometimes leaner proteins as well.   - Typically low on vegetables, higher in carb/grain.   - Using CPAP still. But doesn't like it.     Right TM region has been causing him pain for a while now. It was clicking initially but now has pain if he opens too far. No dental pain.  No ear pain.       Review of Systems   Constitutional, HEENT, cardiovascular, pulmonary, gi and gu, endocrine systems are negative, except as otherwise noted.      Objective    BP (!) 168/110   Pulse 83   Temp 98.4  F (36.9  C)   Resp 16   Ht 1.892 m (6' 2.49\")   Wt 145.4 kg (320 lb 8 oz)   SpO2 97%   BMI 40.61 kg/m    Body mass index is 40.61 kg/m .  Physical Exam   GENERAL: healthy, alert and no distress  HENT: normal cephalic/atraumatic, ear canals and TM's normal, and Right side jaw there is no palpable parotid masses, there is mild tenderness over the TM joint on the right, no clicking or locking.  There are dental caries noted.   RESP: lungs clear to auscultation - no rales, rhonchi or wheezes  CV: " regular rate and rhythm, normal S1 S2, no S3 or S4, no murmur, click or rub, no peripheral edema and peripheral pulses strong  MS: no gross musculoskeletal defects noted, no edema

## 2023-10-03 NOTE — PROGRESS NOTES
Assessment & Plan     Abdominal pain, epigastric  Pain onset 3 days ago.  Is getting better.  He has had no fever.  All signs are stable except for his blood pressure.  Comfortable.  Labs off including elevated blood count and lipase and bilirubin.  Diagnosis is pancreatitis possibly caused by biliary tract issue.  He is scheduled for upper abdomen ultrasound to evaluate gallbladder and biliary tract and liver in 3 days.  Offered transfer to the emergency room for further immediate imaging as he was seen late on Friday afternoon and we would not be able to accomplish that.  He and his wife agree that he is feeling better and he will monitor him at home.  Remain on liquid diet.  He was given some more Percocet and some Zofran.  So recommended stool softener as he has not had a bowel movement in 2 days.  Discussed with him if he develops fever vomiting increased pain or unable to keep fluids down he needs to report immediately to the emergency room as we discussed the possibility of cholangitis.  Understand and want to try going home follow-up next week.  - UA Macro with Reflex to Micro and Culture - lab collect; Future  - CBC with platelets and differential; Future  - Lipase; Future  - Comprehensive metabolic panel (BMP + Alb, Alk Phos, ALT, AST, Total. Bili, TP); Future  - UA Macro with Reflex to Micro and Culture - lab collect  - CBC with platelets and differential  - Lipase  - Comprehensive metabolic panel (BMP + Alb, Alk Phos, ALT, AST, Total. Bili, TP)  - US Abdomen Complete; Future  - oxyCODONE-acetaminophen (PERCOCET) 5-325 MG tablet; Take 1 tablet by mouth every 6 hours as needed for pain  - ondansetron (ZOFRAN) 4 MG tablet; Take 1 tablet (4 mg) by mouth every 8 hours as needed for nausea    Benign essential hypertension  He had stopped his lisinopril for some reason.  He knows he needs to get back on we discussed the importance of this.  He was reassured this.  He will monitor his blood pressures at home  "and he has a follow-up visit to discuss this.  - lisinopril (ZESTRIL) 10 MG tablet; Take 1 tablet (10 mg) by mouth daily      45 minutes spent on the date of the encounter doing chart review, review of test results, interpretation of tests, patient visit, documentation and discussion with family        BMI:   Estimated body mass index is 37.2 kg/m  as calculated from the following:    Height as of 2/24/21: 1.895 m (6' 2.61\").    Weight as of this encounter: 133.6 kg (294 lb 8 oz).   Weight management plan: Discussed healthy diet and exercise guidelines    See Patient Instructions    Return in about 1 day (around 10/23/2021) for Recheck if symptoms worsen or fail to improve.    Huang Tellez MD  [facility]    Sailaja Broderick is a 41 year old who presents for the following health issues  accompanied by his spouse.    HPI     Chief Complaint   Patient presents with     Pancreatitis     possible pancreatitis, haven't been feeling good since Tues., diagnosed last year and would like it rechecked, also family history of gallbladder issues, low grade fever yesterday         Review of Systems   Constitutional, HEENT, cardiovascular, pulmonary, GI, , musculoskeletal, neuro, skin, endocrine and psych systems are negative, except as otherwise noted.      Objective    BP (!) 152/104   Pulse (!) 122   Temp 97.6  F (36.4  C) (Temporal)   Resp 16   Wt 133.6 kg (294 lb 8 oz)   SpO2 98%   BMI 37.20 kg/m    Body mass index is 37.2 kg/m .  Physical Exam   GENERAL: healthy, alert and no distress  EYES: Eyes grossly normal to inspection, PERRL and conjunctivae and sclerae normal  HENT: ear canals and TM's normal, nose and mouth without ulcers or lesions  NECK: no adenopathy, no asymmetry, masses, or scars and thyroid normal to palpation  RESP: lungs clear to auscultation - no rales, rhonchi or wheezes  CV: regular rate and rhythm, normal S1 S2, no S3 or S4, no murmur, click or rub, no peripheral " edema and peripheral pulses strong  ABDOMEN: bowel sounds normal and abdomen with no distention some tenderness in the epigastric area.  MS: no gross musculoskeletal defects noted, no edema  SKIN: no suspicious lesions or rashes  NEURO: Normal strength and tone, mentation intact and speech normal  PSYCH: mentation appears normal, affect normal/bright    Results for orders placed or performed in visit on 10/22/21   UA Macro with Reflex to Micro and Culture - lab collect     Status: Abnormal    Specimen: Urine, NOS   Result Value Ref Range    Color Urine Radha (A) Colorless, Straw, Light Yellow, Yellow    Appearance Urine Slightly Cloudy (A) Clear    Glucose Urine Negative Negative mg/dL    Bilirubin Urine Small (A) Negative    Ketones Urine 20  (A) Negative mg/dL    Specific Gravity Urine 1.030 1.003 - 1.035    Blood Urine Small (A) Negative    pH Urine 5.0 5.0 - 7.0    Protein Albumin Urine >499 (A) Negative mg/dL    Urobilinogen Urine 4.0 (A) Normal, 2.0 mg/dL    Nitrite Urine Negative Negative    Leukocyte Esterase Urine Negative Negative    Bacteria Urine Moderate (A) None Seen /HPF    Mucus Urine Present (A) None Seen /LPF    RBC Urine 4 (H) <=2 /HPF    WBC Urine 3 <=5 /HPF    Squamous Epithelials Urine 1 <=1 /HPF    Narrative    Urine Culture not indicated   Lipase     Status: Abnormal   Result Value Ref Range    Lipase 410 (H) 73 - 393 U/L   Comprehensive metabolic panel (BMP + Alb, Alk Phos, ALT, AST, Total. Bili, TP)     Status: Abnormal   Result Value Ref Range    Sodium 135 133 - 144 mmol/L    Potassium 3.3 (L) 3.4 - 5.3 mmol/L    Chloride 101 94 - 109 mmol/L    Carbon Dioxide (CO2) 26 20 - 32 mmol/L    Anion Gap 8 3 - 14 mmol/L    Urea Nitrogen 13 7 - 30 mg/dL    Creatinine 0.88 0.66 - 1.25 mg/dL    Calcium 9.0 8.5 - 10.1 mg/dL    Glucose 123 (H) 70 - 99 mg/dL    Alkaline Phosphatase 98 40 - 150 U/L    AST 15 0 - 45 U/L    ALT 42 0 - 70 U/L    Protein Total 8.9 (H) 6.8 - 8.8 g/dL    Albumin 3.7 3.4 - 5.0  g/dL    Bilirubin Total 2.1 (H) 0.2 - 1.3 mg/dL    GFR Estimate >90 >60 mL/min/1.73m2   CBC with platelets and differential     Status: Abnormal   Result Value Ref Range    WBC Count 18.5 (H) 4.0 - 11.0 10e3/uL    RBC Count 5.54 4.40 - 5.90 10e6/uL    Hemoglobin 16.1 13.3 - 17.7 g/dL    Hematocrit 47.3 40.0 - 53.0 %    MCV 85 78 - 100 fL    MCH 29.1 26.5 - 33.0 pg    MCHC 34.0 31.5 - 36.5 g/dL    RDW 13.2 10.0 - 15.0 %    Platelet Count 214 150 - 450 10e3/uL    % Neutrophils 85 %    % Lymphocytes 5 %    % Monocytes 8 %    % Eosinophils 1 %    % Basophils 0 %    % Immature Granulocytes 1 %    NRBCs per 100 WBC 0 <1 /100    Absolute Neutrophils 15.8 (H) 1.6 - 8.3 10e3/uL    Absolute Lymphocytes 0.9 0.8 - 5.3 10e3/uL    Absolute Monocytes 1.4 (H) 0.0 - 1.3 10e3/uL    Absolute Eosinophils 0.2 0.0 - 0.7 10e3/uL    Absolute Basophils 0.0 0.0 - 0.2 10e3/uL    Absolute Immature Granulocytes 0.1 (H) <=0.0 10e3/uL    Absolute NRBCs 0.0 10e3/uL   CBC with platelets and differential     Status: Abnormal    Narrative    The following orders were created for panel order CBC with platelets and differential.  Procedure                               Abnormality         Status                     ---------                               -----------         ------                     CBC with platelets and d...[[id number]]  Abnormal            Final result                 Please view results for these tests on the individual orders.                Rituxan Pregnancy And Lactation Text: This medication is Pregnancy Category C and it isn't know if it is safe during pregnancy. It is unknown if this medication is excreted in breast milk but similar antibodies are known to be excreted.

## 2023-10-10 ENCOUNTER — LAB (OUTPATIENT)
Dept: LAB | Facility: OTHER | Age: 43
End: 2023-10-10
Payer: COMMERCIAL

## 2023-10-10 ENCOUNTER — ALLIED HEALTH/NURSE VISIT (OUTPATIENT)
Dept: FAMILY MEDICINE | Facility: OTHER | Age: 43
End: 2023-10-10
Payer: COMMERCIAL

## 2023-10-10 VITALS — DIASTOLIC BLOOD PRESSURE: 88 MMHG | SYSTOLIC BLOOD PRESSURE: 132 MMHG

## 2023-10-10 DIAGNOSIS — Z01.30 BP CHECK: Primary | ICD-10-CM

## 2023-10-10 DIAGNOSIS — R53.83 OTHER FATIGUE: ICD-10-CM

## 2023-10-10 DIAGNOSIS — R73.03 PREDIABETES: ICD-10-CM

## 2023-10-10 DIAGNOSIS — E87.5 HYPERKALEMIA: Primary | ICD-10-CM

## 2023-10-10 DIAGNOSIS — Z13.220 SCREENING FOR HYPERLIPIDEMIA: ICD-10-CM

## 2023-10-10 DIAGNOSIS — I10 BENIGN ESSENTIAL HYPERTENSION: ICD-10-CM

## 2023-10-10 LAB
ALBUMIN SERPL BCG-MCNC: 4.6 G/DL (ref 3.5–5.2)
ALP SERPL-CCNC: 98 U/L (ref 40–129)
ALT SERPL W P-5'-P-CCNC: 45 U/L (ref 0–70)
ANION GAP SERPL CALCULATED.3IONS-SCNC: 11 MMOL/L (ref 7–15)
AST SERPL W P-5'-P-CCNC: 25 U/L (ref 0–45)
BILIRUB SERPL-MCNC: 0.6 MG/DL
BUN SERPL-MCNC: 12.5 MG/DL (ref 6–20)
CALCIUM SERPL-MCNC: 9.6 MG/DL (ref 8.6–10)
CHLORIDE SERPL-SCNC: 102 MMOL/L (ref 98–107)
CHOLEST SERPL-MCNC: 217 MG/DL
CREAT SERPL-MCNC: 0.9 MG/DL (ref 0.67–1.17)
DEPRECATED HCO3 PLAS-SCNC: 26 MMOL/L (ref 22–29)
EGFRCR SERPLBLD CKD-EPI 2021: >90 ML/MIN/1.73M2
ERYTHROCYTE [DISTWIDTH] IN BLOOD BY AUTOMATED COUNT: 13 % (ref 10–15)
GLUCOSE SERPL-MCNC: 124 MG/DL (ref 70–99)
HBA1C MFR BLD: 5.8 % (ref 0–5.6)
HCT VFR BLD AUTO: 47.6 % (ref 40–53)
HDLC SERPL-MCNC: 57 MG/DL
HGB BLD-MCNC: 15.4 G/DL (ref 13.3–17.7)
LDLC SERPL CALC-MCNC: 139 MG/DL
MCH RBC QN AUTO: 28.9 PG (ref 26.5–33)
MCHC RBC AUTO-ENTMCNC: 32.4 G/DL (ref 31.5–36.5)
MCV RBC AUTO: 90 FL (ref 78–100)
NONHDLC SERPL-MCNC: 160 MG/DL
PLATELET # BLD AUTO: 189 10E3/UL (ref 150–450)
POTASSIUM SERPL-SCNC: 5.6 MMOL/L (ref 3.4–5.3)
PROT SERPL-MCNC: 7.9 G/DL (ref 6.4–8.3)
RBC # BLD AUTO: 5.32 10E6/UL (ref 4.4–5.9)
SHBG SERPL-SCNC: 31 NMOL/L (ref 11–80)
SODIUM SERPL-SCNC: 139 MMOL/L (ref 135–145)
TRIGL SERPL-MCNC: 103 MG/DL
TSH SERPL DL<=0.005 MIU/L-ACNC: 0.55 UIU/ML (ref 0.3–4.2)
WBC # BLD AUTO: 5.8 10E3/UL (ref 4–11)

## 2023-10-10 PROCEDURE — 80061 LIPID PANEL: CPT

## 2023-10-10 PROCEDURE — 84270 ASSAY OF SEX HORMONE GLOBUL: CPT

## 2023-10-10 PROCEDURE — 99207 PR NO CHARGE NURSE ONLY: CPT

## 2023-10-10 PROCEDURE — 80053 COMPREHEN METABOLIC PANEL: CPT

## 2023-10-10 PROCEDURE — 84403 ASSAY OF TOTAL TESTOSTERONE: CPT

## 2023-10-10 PROCEDURE — 85027 COMPLETE CBC AUTOMATED: CPT

## 2023-10-10 PROCEDURE — 83036 HEMOGLOBIN GLYCOSYLATED A1C: CPT

## 2023-10-10 PROCEDURE — 36415 COLL VENOUS BLD VENIPUNCTURE: CPT

## 2023-10-10 PROCEDURE — 84443 ASSAY THYROID STIM HORMONE: CPT

## 2023-10-10 NOTE — PROGRESS NOTES
Davie Honeycutt is a 43 year old patient who comes in today for a Blood Pressure check with a Medical Assistant because of medication change.  Initial BP:  /88      Blood pressure is not high.   Repeat Blood pressure: No  Patient is taking medication as prescribed.  Patient is tolerating medications well.  Is patient taking blood pressures at home? No  Current Symptoms: none    Disposition:   Abnormal Blood Pressure NO  Yes: Huddled with:  due to abnormal result. Create a telephone encounter copy this note and route to requesting provider/PCP.  No: CC this chart to requesting provider/PCP.

## 2023-10-13 LAB
TESTOST FREE SERPL-MCNC: 6.83 NG/DL
TESTOST SERPL-MCNC: 331 NG/DL (ref 240–950)

## 2023-10-16 ENCOUNTER — TELEPHONE (OUTPATIENT)
Dept: FAMILY MEDICINE | Facility: OTHER | Age: 43
End: 2023-10-16
Payer: COMMERCIAL

## 2023-10-16 NOTE — TELEPHONE ENCOUNTER
TCB to get results or told him to view the MyChart message the provider sent.    ----- Message from Barbara Garcia PA-C sent at 10/16/2023 10:14 AM CDT -----  Please call patient, notify him of MyChart result note, I do not see that he viewed any of them.     Barbara Garcia PA-C

## 2023-12-28 DIAGNOSIS — I10 BENIGN ESSENTIAL HYPERTENSION: ICD-10-CM

## 2023-12-28 RX ORDER — LISINOPRIL 40 MG/1
40 TABLET ORAL DAILY
Qty: 90 TABLET | Refills: 0 | Status: SHIPPED | OUTPATIENT
Start: 2023-12-28 | End: 2024-03-26

## 2023-12-28 NOTE — TELEPHONE ENCOUNTER
ES patient. Refill sent. Needs potassium rechecked so please help schedule.    Jarod Navarrete PA-C

## 2024-01-18 ENCOUNTER — LAB (OUTPATIENT)
Dept: LAB | Facility: OTHER | Age: 44
End: 2024-01-18
Payer: COMMERCIAL

## 2024-01-18 DIAGNOSIS — E87.5 HYPERKALEMIA: ICD-10-CM

## 2024-01-18 LAB — POTASSIUM SERPL-SCNC: 4.8 MMOL/L (ref 3.4–5.3)

## 2024-01-18 PROCEDURE — 36415 COLL VENOUS BLD VENIPUNCTURE: CPT

## 2024-01-18 PROCEDURE — 84132 ASSAY OF SERUM POTASSIUM: CPT

## 2024-02-05 ENCOUNTER — VIRTUAL VISIT (OUTPATIENT)
Dept: FAMILY MEDICINE | Facility: OTHER | Age: 44
End: 2024-02-05
Payer: COMMERCIAL

## 2024-02-05 DIAGNOSIS — R73.03 PREDIABETES: Primary | ICD-10-CM

## 2024-02-05 PROCEDURE — 99213 OFFICE O/P EST LOW 20 MIN: CPT | Mod: 95 | Performed by: PHYSICIAN ASSISTANT

## 2024-02-05 RX ORDER — METFORMIN HCL 500 MG
500 TABLET, EXTENDED RELEASE 24 HR ORAL
Qty: 90 TABLET | Refills: 1 | Status: SHIPPED | OUTPATIENT
Start: 2024-02-05

## 2024-02-05 NOTE — PROGRESS NOTES
"    Instructions Relayed to Patient by Virtual Roomer:     Patient is active on [employer]:   Relayed following to patient: \"It looks like you are active on [employer], are you able to join the visit this way? If not, do you need us to send you a link now or would you like your provider to send a link via text or email when they are ready to initiate the visit?\"    Reminded patient to ensure they were logged on to virtual visit by arrival time listed. Documented in appointment notes if patient had flexibility to initiate visit sooner than arrival time. If pediatric virtual visit, ensured pediatric patient along with parent/guardian will be present for video visit.     Patient offered the website [email or web address] and/or phone number to [employer] Help line: [phone]    Davie is a 43 year old who is being evaluated via a billable video visit.      How would you like to obtain your AVS? [email or web address]  If the video visit is dropped, the invitation should be resent by: Text to cell phone: [phone]  Will anyone else be joining your video visit? No          Assessment & Plan     (R73.03) Prediabetes  (primary encounter diagnosis)  Comment: We discussed medication options for him.  Because of insurance and supply chain we are going to hold on the GLP1 options for right now and start him on Metformin to see how he tolerates this.  We discussed potential side effects and how to take medication, we discussed how it works in his body to help with his blood sugar. Recommended lowering red meat in diet and increasing in lean proteins. Recommended to increase in Fiber as well around 38 grams per day if possible and increase in Omega 3 fats in diet.    Plan: metFORMIN (GLUCOPHAGE XR) 500 MG 24 hr tablet                  BMI  Estimated body mass index is 40.61 kg/m  as calculated from the following:    Height as of 9/29/23: 1.892 m (6' 2.49\").    Weight as of 9/29/23: 145.4 kg (320 lb 8 oz).   Weight management plan: " Discussed healthy diet and exercise guidelines      Options for treatment and follow-up care were reviewed with the patient and/or guardian. Patient and/or guardian engaged in the decision making process and verbalized understanding of the options discussed and agreed with the final plan.      Sailaja Broderick is a 43 year old, presenting for the following health issues:  prediabetic      2/5/2024     9:23 AM   Additional Questions   Roomed by nayla   Accompanied by self     History of Present Illness       Reason for visit:  Prevention for prediabetic      Pt says he wants to talk to provider about preventative measures for prediabetes        Review of Systems  Constitutional, HEENT, cardiovascular, pulmonary, gi and gu systems are negative, except as otherwise noted.      Objective           Vitals:  No vitals were obtained today due to virtual visit.    Physical Exam   GENERAL: alert and no distress  EYES: Eyes grossly normal to inspection.  No discharge or erythema, or obvious scleral/conjunctival abnormalities.  RESP: No audible wheeze, cough, or visible cyanosis.    SKIN: Visible skin clear. No significant rash, abnormal pigmentation or lesions.  NEURO: Cranial nerves grossly intact.  Mentation and speech appropriate for age.  PSYCH: Appropriate affect, tone, and pace of words          Video-Visit Details    Type of service:  Video Visit     Originating Location (pt. Location): Home  Distant Location (provider location):  Off-site  Platform used for Video Visit: Janes  Signed Electronically by: Barbara Garcia PA-C

## 2024-03-16 ENCOUNTER — HEALTH MAINTENANCE LETTER (OUTPATIENT)
Age: 44
End: 2024-03-16

## 2024-03-26 ENCOUNTER — MYC REFILL (OUTPATIENT)
Dept: FAMILY MEDICINE | Facility: OTHER | Age: 44
End: 2024-03-26
Payer: COMMERCIAL

## 2024-03-26 DIAGNOSIS — I10 BENIGN ESSENTIAL HYPERTENSION: ICD-10-CM

## 2024-03-26 RX ORDER — LISINOPRIL 40 MG/1
40 TABLET ORAL DAILY
Qty: 90 TABLET | Refills: 0 | Status: SHIPPED | OUTPATIENT
Start: 2024-03-26 | End: 2024-07-03

## 2024-07-03 ENCOUNTER — MYC REFILL (OUTPATIENT)
Dept: FAMILY MEDICINE | Facility: OTHER | Age: 44
End: 2024-07-03
Payer: COMMERCIAL

## 2024-07-03 DIAGNOSIS — I10 BENIGN ESSENTIAL HYPERTENSION: ICD-10-CM

## 2024-07-03 RX ORDER — LISINOPRIL 40 MG/1
40 TABLET ORAL DAILY
Qty: 90 TABLET | Refills: 0 | OUTPATIENT
Start: 2024-07-03

## 2024-07-03 RX ORDER — LISINOPRIL 40 MG/1
40 TABLET ORAL DAILY
Qty: 90 TABLET | Refills: 0 | Status: SHIPPED | OUTPATIENT
Start: 2024-07-03

## 2024-08-14 ENCOUNTER — E-VISIT (OUTPATIENT)
Dept: FAMILY MEDICINE | Facility: OTHER | Age: 44
End: 2024-08-14
Payer: COMMERCIAL

## 2024-08-14 ENCOUNTER — MYC MEDICAL ADVICE (OUTPATIENT)
Dept: FAMILY MEDICINE | Facility: OTHER | Age: 44
End: 2024-08-14
Payer: COMMERCIAL

## 2024-08-14 DIAGNOSIS — H10.31 ACUTE CONJUNCTIVITIS OF RIGHT EYE, UNSPECIFIED ACUTE CONJUNCTIVITIS TYPE: Primary | ICD-10-CM

## 2024-08-14 PROCEDURE — 99421 OL DIG E/M SVC 5-10 MIN: CPT | Performed by: PHYSICIAN ASSISTANT

## 2024-08-15 RX ORDER — POLYMYXIN B SULFATE AND TRIMETHOPRIM 1; 10000 MG/ML; [USP'U]/ML
SOLUTION OPHTHALMIC
Qty: 10 ML | Refills: 0 | Status: SHIPPED | OUTPATIENT
Start: 2024-08-15 | End: 2024-08-22

## 2024-10-22 DIAGNOSIS — I10 BENIGN ESSENTIAL HYPERTENSION: ICD-10-CM

## 2024-10-22 RX ORDER — LISINOPRIL 40 MG/1
40 TABLET ORAL DAILY
Qty: 90 TABLET | Refills: 0 | Status: SHIPPED | OUTPATIENT
Start: 2024-10-22

## 2024-10-27 ENCOUNTER — MYC REFILL (OUTPATIENT)
Dept: FAMILY MEDICINE | Facility: OTHER | Age: 44
End: 2024-10-27
Payer: COMMERCIAL

## 2024-10-27 DIAGNOSIS — I10 BENIGN ESSENTIAL HYPERTENSION: ICD-10-CM

## 2024-10-28 RX ORDER — LISINOPRIL 40 MG/1
40 TABLET ORAL DAILY
Qty: 90 TABLET | Refills: 0 | OUTPATIENT
Start: 2024-10-28

## 2025-05-12 ENCOUNTER — RESULTS FOLLOW-UP (OUTPATIENT)
Dept: FAMILY MEDICINE | Facility: OTHER | Age: 45
End: 2025-05-12

## 2025-05-12 ENCOUNTER — OFFICE VISIT (OUTPATIENT)
Dept: FAMILY MEDICINE | Facility: OTHER | Age: 45
End: 2025-05-12
Payer: COMMERCIAL

## 2025-05-12 ENCOUNTER — TELEPHONE (OUTPATIENT)
Dept: FAMILY MEDICINE | Facility: OTHER | Age: 45
End: 2025-05-12

## 2025-05-12 VITALS
OXYGEN SATURATION: 99 % | TEMPERATURE: 98.3 F | HEART RATE: 97 BPM | RESPIRATION RATE: 18 BRPM | SYSTOLIC BLOOD PRESSURE: 130 MMHG | HEIGHT: 74 IN | BODY MASS INDEX: 36.77 KG/M2 | WEIGHT: 286.5 LBS | DIASTOLIC BLOOD PRESSURE: 86 MMHG

## 2025-05-12 DIAGNOSIS — Z00.00 ROUTINE GENERAL MEDICAL EXAMINATION AT A HEALTH CARE FACILITY: Primary | ICD-10-CM

## 2025-05-12 DIAGNOSIS — R73.03 PREDIABETES: ICD-10-CM

## 2025-05-12 DIAGNOSIS — E66.01 CLASS 2 SEVERE OBESITY WITH SERIOUS COMORBIDITY AND BODY MASS INDEX (BMI) OF 36.0 TO 36.9 IN ADULT, UNSPECIFIED OBESITY TYPE (H): ICD-10-CM

## 2025-05-12 DIAGNOSIS — G47.33 OSA (OBSTRUCTIVE SLEEP APNEA): ICD-10-CM

## 2025-05-12 DIAGNOSIS — I10 BENIGN ESSENTIAL HYPERTENSION: ICD-10-CM

## 2025-05-12 DIAGNOSIS — E66.812 CLASS 2 SEVERE OBESITY WITH SERIOUS COMORBIDITY AND BODY MASS INDEX (BMI) OF 36.0 TO 36.9 IN ADULT, UNSPECIFIED OBESITY TYPE (H): ICD-10-CM

## 2025-05-12 DIAGNOSIS — E78.5 HYPERLIPIDEMIA LDL GOAL <160: ICD-10-CM

## 2025-05-12 DIAGNOSIS — E03.9 HYPOTHYROIDISM, UNSPECIFIED TYPE: ICD-10-CM

## 2025-05-12 LAB
ALBUMIN SERPL BCG-MCNC: 4.5 G/DL (ref 3.5–5.2)
ALP SERPL-CCNC: 86 U/L (ref 40–150)
ALT SERPL W P-5'-P-CCNC: 29 U/L (ref 0–70)
ANION GAP SERPL CALCULATED.3IONS-SCNC: 10 MMOL/L (ref 7–15)
AST SERPL W P-5'-P-CCNC: 18 U/L (ref 0–45)
BILIRUB SERPL-MCNC: 0.4 MG/DL
BUN SERPL-MCNC: 13.1 MG/DL (ref 6–20)
CALCIUM SERPL-MCNC: 9.6 MG/DL (ref 8.8–10.4)
CHLORIDE SERPL-SCNC: 103 MMOL/L (ref 98–107)
CHOLEST SERPL-MCNC: 189 MG/DL
CREAT SERPL-MCNC: 0.97 MG/DL (ref 0.67–1.17)
EGFRCR SERPLBLD CKD-EPI 2021: >90 ML/MIN/1.73M2
EST. AVERAGE GLUCOSE BLD GHB EST-MCNC: 108 MG/DL
FASTING STATUS PATIENT QL REPORTED: YES
FASTING STATUS PATIENT QL REPORTED: YES
GLUCOSE SERPL-MCNC: 99 MG/DL (ref 70–99)
HBA1C MFR BLD: 5.4 % (ref 0–5.6)
HCO3 SERPL-SCNC: 27 MMOL/L (ref 22–29)
HDLC SERPL-MCNC: 60 MG/DL
LDLC SERPL CALC-MCNC: 97 MG/DL
NONHDLC SERPL-MCNC: 129 MG/DL
POTASSIUM SERPL-SCNC: 4.5 MMOL/L (ref 3.4–5.3)
PROT SERPL-MCNC: 7.6 G/DL (ref 6.4–8.3)
SODIUM SERPL-SCNC: 140 MMOL/L (ref 135–145)
T4 FREE SERPL-MCNC: 1.25 NG/DL (ref 0.9–1.7)
TRIGL SERPL-MCNC: 158 MG/DL
TSH SERPL DL<=0.005 MIU/L-ACNC: 0.21 UIU/ML (ref 0.3–4.2)

## 2025-05-12 PROCEDURE — 3075F SYST BP GE 130 - 139MM HG: CPT | Performed by: PHYSICIAN ASSISTANT

## 2025-05-12 PROCEDURE — 80053 COMPREHEN METABOLIC PANEL: CPT | Performed by: PHYSICIAN ASSISTANT

## 2025-05-12 PROCEDURE — 84439 ASSAY OF FREE THYROXINE: CPT | Performed by: PHYSICIAN ASSISTANT

## 2025-05-12 PROCEDURE — 36415 COLL VENOUS BLD VENIPUNCTURE: CPT | Performed by: PHYSICIAN ASSISTANT

## 2025-05-12 PROCEDURE — 99396 PREV VISIT EST AGE 40-64: CPT | Performed by: PHYSICIAN ASSISTANT

## 2025-05-12 PROCEDURE — 83036 HEMOGLOBIN GLYCOSYLATED A1C: CPT | Performed by: PHYSICIAN ASSISTANT

## 2025-05-12 PROCEDURE — 3079F DIAST BP 80-89 MM HG: CPT | Performed by: PHYSICIAN ASSISTANT

## 2025-05-12 PROCEDURE — G2211 COMPLEX E/M VISIT ADD ON: HCPCS | Performed by: PHYSICIAN ASSISTANT

## 2025-05-12 PROCEDURE — 99213 OFFICE O/P EST LOW 20 MIN: CPT | Mod: 25 | Performed by: PHYSICIAN ASSISTANT

## 2025-05-12 PROCEDURE — 80061 LIPID PANEL: CPT | Performed by: PHYSICIAN ASSISTANT

## 2025-05-12 PROCEDURE — 1126F AMNT PAIN NOTED NONE PRSNT: CPT | Performed by: PHYSICIAN ASSISTANT

## 2025-05-12 PROCEDURE — 84443 ASSAY THYROID STIM HORMONE: CPT | Performed by: PHYSICIAN ASSISTANT

## 2025-05-12 RX ORDER — LEVOTHYROXINE, LIOTHYRONINE 38; 9 UG/1; UG/1
60 TABLET ORAL DAILY
COMMUNITY
Start: 2025-03-10

## 2025-05-12 RX ORDER — LISINOPRIL 10 MG/1
10 TABLET ORAL DAILY
Qty: 90 TABLET | Refills: 3 | Status: SHIPPED | OUTPATIENT
Start: 2025-05-12

## 2025-05-12 SDOH — HEALTH STABILITY: PHYSICAL HEALTH: ON AVERAGE, HOW MANY MINUTES DO YOU ENGAGE IN EXERCISE AT THIS LEVEL?: 40 MIN

## 2025-05-12 SDOH — HEALTH STABILITY: PHYSICAL HEALTH: ON AVERAGE, HOW MANY DAYS PER WEEK DO YOU ENGAGE IN MODERATE TO STRENUOUS EXERCISE (LIKE A BRISK WALK)?: 4 DAYS

## 2025-05-12 ASSESSMENT — PAIN SCALES - GENERAL: PAINLEVEL_OUTOF10: NO PAIN (0)

## 2025-05-12 ASSESSMENT — SOCIAL DETERMINANTS OF HEALTH (SDOH): HOW OFTEN DO YOU GET TOGETHER WITH FRIENDS OR RELATIVES?: ONCE A WEEK

## 2025-05-12 NOTE — PATIENT INSTRUCTIONS
Patient Education   Preventive Care Advice   This is general advice given by our system to help you stay healthy. However, your care team may have specific advice just for you. Please talk to your care team about your preventive care needs.  Nutrition  Eat 5 or more servings of fruits and vegetables each day.  Try wheat bread, brown rice and whole grain pasta (instead of white bread, rice, and pasta).  Get enough calcium and vitamin D. Check the label on foods and aim for 100% of the RDA (recommended daily allowance).  Lifestyle  Exercise at least 150 minutes each week  (30 minutes a day, 5 days a week).  Do muscle strengthening activities 2 days a week. These help control your weight and prevent disease.  No smoking.  Wear sunscreen to prevent skin cancer.  Have a dental exam and cleaning every 6 months.  Yearly exams  See your health care team every year to talk about:  Any changes in your health.  Any medicines your care team has prescribed.  Preventive care, family planning, and ways to prevent chronic diseases.  Shots (vaccines)   HPV shots (up to age 26), if you've never had them before.  Hepatitis B shots (up to age 59), if you've never had them before.  COVID-19 shot: Get this shot when it's due.  Flu shot: Get a flu shot every year.  Tetanus shot: Get a tetanus shot every 10 years.  Pneumococcal, hepatitis A, and RSV shots: Ask your care team if you need these based on your risk.  Shingles shot (for age 50 and up)  General health tests  Diabetes screening:  Starting at age 35, Get screened for diabetes at least every 3 years.  If you are younger than age 35, ask your care team if you should be screened for diabetes.  Cholesterol test: At age 39, start having a cholesterol test every 5 years, or more often if advised.  Bone density scan (DEXA): At age 50, ask your care team if you should have this scan for osteoporosis (brittle bones).  Hepatitis C: Get tested at least once in your life.  STIs (sexually  transmitted infections)  Before age 24: Ask your care team if you should be screened for STIs.  After age 24: Get screened for STIs if you're at risk. You are at risk for STIs (including HIV) if:  You are sexually active with more than one person.  You don't use condoms every time.  You or a partner was diagnosed with a sexually transmitted infection.  If you are at risk for HIV, ask about PrEP medicine to prevent HIV.  Get tested for HIV at least once in your life, whether you are at risk for HIV or not.  Cancer screening tests  Cervical cancer screening: If you have a cervix, begin getting regular cervical cancer screening tests starting at age 21.  Breast cancer scan (mammogram): If you've ever had breasts, begin having regular mammograms starting at age 40. This is a scan to check for breast cancer.  Colon cancer screening: It is important to start screening for colon cancer at age 45.  Have a colonoscopy test every 10 years (or more often if you're at risk) Or, ask your provider about stool tests like a FIT test every year or Cologuard test every 3 years.  To learn more about your testing options, visit:   .  For help making a decision, visit:   https://bit.ly/jd12851.  Prostate cancer screening test: If you have a prostate, ask your care team if a prostate cancer screening test (PSA) at age 55 is right for you.  Lung cancer screening: If you are a current or former smoker ages 50 to 80, ask your care team if ongoing lung cancer screenings are right for you.  For informational purposes only. Not to replace the advice of your health care provider. Copyright   2023 [employer]. All rights reserved. Clinically reviewed by the [facility] Transitions Program. [employer] [id number] - REV 01/24.

## 2025-05-12 NOTE — PROGRESS NOTES
Preventive Care Visit  [facility]  Barbara Garcia PA-C, Family Medicine  May 12, 2025      Assessment & Plan     Routine general medical examination at a health care facility  - Discussed recommended vaccinations, declines today.     Benign essential hypertension  BP well controlled. He has noticed that he has had to take bites out of his tablet because he was getting lightheaded.  Reducing dose to 10 mg so he can take 1/2 tablet of 5 mg if needed instead.  Ok to stop if still having side effects, he has lost 40 lbs and significantly reduced his alcohol consumption which is likely bringing his BP down.   - Comprehensive metabolic panel (BMP + Alb, Alk Phos, ALT, AST, Total. Bili, TP); Future  - lisinopril (ZESTRIL) 10 MG tablet; Take 1 tablet (10 mg) by mouth daily.  - Comprehensive metabolic panel (BMP + Alb, Alk Phos, ALT, AST, Total. Bili, TP)    Prediabetes  This is likely corrected with use of Tirzepatide.  Will check lab.    - Hemoglobin A1c; Future  - Comprehensive metabolic panel (BMP + Alb, Alk Phos, ALT, AST, Total. Bili, TP); Future  - Hemoglobin A1c  - Comprehensive metabolic panel (BMP + Alb, Alk Phos, ALT, AST, Total. Bili, TP)    Hyperlipidemia LDL goal <160  Monitoring.   - Lipid panel reflex to direct LDL Fasting; Future  - Lipid panel reflex to direct LDL Fasting    DEDRA (obstructive sleep apnea)  Uses CPAP regularly at night, tolerates it, has a nasal mask. Has never replaced any  parts since using.  Benefits from use.  Supplies refilled.   - tirzepatide-Weight Management (ZEPBOUND) 2.5 MG/0.5ML prefilled pen; Inject 0.5 mLs (2.5 mg) subcutaneously every 7 days.  - CPAP Order for DME - ONLY FOR DME    Class 2 severe obesity with serious comorbidity and body mass index (BMI) of 36.0 to 36.9 in adult, unspecified obesity type (H)  We will see if his insurance will cover Tirzepatide for DEDRA as this is a newer indication this last winter.  If they do we will work to titrate  "him up. Sounds like he is probably between 7.5-10 mg per week dose based on his current compounded solution.  Consider Direct to consumer option as well from "[employer]" as it sounds like cost may be similar, he will look into this and we can send it if he would like.     Hypothyroidism, unspecified type  He was diagnosed through [facility] clinic in [place].  He is on NP Thyroid.  Discussed how thyroid function tends to improve with weight loss so may need adjustments in dose down if well controlled. Monitoring lab.   - TSH WITH FREE T4 REFLEX; Future  - TSH WITH FREE T4 REFLEX        BMI  Estimated body mass index is 36.57 kg/m  as calculated from the following:    Height as of this encounter: 1.885 m (6' 2.21\").    Weight as of this encounter: 130 kg (286 lb 8 oz).   Weight management plan: Discussed healthy diet and exercise guidelines    Counseling  Appropriate preventive services were addressed with this patient via screening, questionnaire, or discussion as appropriate for fall prevention, nutrition, physical activity, Tobacco-use cessation, social engagement, weight loss and cognition.  Checklist reviewing preventive services available has been given to the patient.  Reviewed patient's diet, addressing concerns and/or questions.   The patient was instructed to see the dentist every 6 months.   The patient reports drinking more than 3 alcoholic drinks per day and/or more than 7 drhnks per week. The patient was counseled and given information about possible harmful effects of excessive alcohol intake.    The longitudinal plan of care for the diagnosis(es)/condition(s) as documented were addressed during this visit. Due to the added complexity in care, I will continue to support Davie in the subsequent management and with ongoing continuity of care.      Sailaja Broderick is a 44 year old, presenting for the following:  Physical        5/12/2025     1:30 PM   Additional Questions   Roomed by Sophia, " MA   Accompanied by Self        HPI  Working out 4-5 days per week.  45-60 minutes.   Tirzepatide - taking 60 Units, not sure the dose.  Has been on medication since August. Feels better on it, eating less cravings are gone.     Advance Care Planning  Discussed advance care planning with patient; informed AVS has link to [facility].        5/12/2025   General Health   How would you rate your overall physical health? Good   Feel stress (tense, anxious, or unable to sleep) Only a little   (!) STRESS CONCERN      5/12/2025   Nutrition   Three or more servings of calcium each day? (!) I DON'T KNOW   Diet: I don't know   How many servings of fruit and vegetables per day? (!) 0-1   How many sweetened beverages each day? 0-1         5/12/2025   Exercise   Days per week of moderate/strenous exercise 4 days   Average minutes spent exercising at this level 40 min         5/12/2025   Social Factors   Frequency of gathering with friends or relatives Once a week   Worry food won't last until get money to buy more Patient declined   Food not last or not have enough money for food? Patient declined   Do you have housing? (Housing is defined as stable permanent housing and does not include staying outside in a car, in a tent, in an abandoned building, in an overnight shelter, or couch-surfing.) Patient declined   Are you worried about losing your housing? Patient declined   Lack of transportation? Patient declined   Unable to get utilities (heat,electricity)? Patient declined         5/12/2025   Dental   Dentist two times every year? (!) NO     Today's PHQ-2 Score:       5/12/2025     1:25 PM   PHQ-2 ( 1999 [employer])   Q1: Little interest or pleasure in doing things 0   Q2: Feeling down, depressed or hopeless 0   PHQ-2 Score 0    Q1: Little interest or pleasure in doing things Not at all   Q2: Feeling down, depressed or hopeless Not at all   PHQ-2 Score 0       Patient-reported         5/12/2025   Substance Use   Alcohol more  than 3/day or more than 7/wk Yes   How often do you have a drink containing alcohol 2 to 4 times a month   How many alcohol drinks on typical day 5 or 6   How often do you have 5+ drinks at one occasion Monthly   Audit 2/3 Score 4   How often not able to stop drinking once started Never   How often failed to do what normally expected Never   How often needed first drink in am after a heavy drinking session Never   How often feeling of guilt or remorse after drinking Never   How often unable to remember what happened the night before Never   Have you or someone else been injured because of your drinking No   Has anyone been concerned or suggested you cut down on drinking No   TOTAL SCORE - AUDIT 6   Do you use any other substances recreationally? No     Social History     Tobacco Use    Smoking status: Former     Passive exposure: Never    Smokeless tobacco: Former     Types: Chew   Vaping Use    Vaping status: Never Used   Substance Use Topics    Alcohol use: Yes     Comment: weekends/social, 6-8 beers per night on weekends    Drug use: No           5/12/2025   STI Screening   New sexual partner(s) since last STI/HIV test? No   ASCVD Risk   The 10-year ASCVD risk score (Gladys DUARTE, et al., 2019) is: 2.1%    Values used to calculate the score:      Age: 44 years      Sex: Male      Is Non- : No      Diabetic: No      Tobacco smoker: No      Systolic Blood Pressure: 130 mmHg      Is BP treated: Yes      HDL Cholesterol: 57 mg/dL      Total Cholesterol: 217 mg/dL       Reviewed and updated as needed this visit by Provider                    Past Medical History:   Diagnosis Date    Hypertension      History reviewed. No pertinent surgical history.  BP Readings from Last 3 Encounters:   05/12/25 130/86   10/10/23 132/88   09/29/23 (!) 168/110    Wt Readings from Last 3 Encounters:   05/12/25 130 kg (286 lb 8 oz)   09/29/23 145.4 kg (320 lb 8 oz)   10/28/22 130.6 kg (288 lb)                 "  Patient Active Problem List   Diagnosis    Benign essential hypertension    Rosacea    DEDRA (obstructive sleep apnea)    Prediabetes     History reviewed. No pertinent surgical history.    Social History     Tobacco Use    Smoking status: Former     Passive exposure: Never    Smokeless tobacco: Former     Types: Chew   Substance Use Topics    Alcohol use: Yes     Comment: weekends/social, 6-8 beers per night on weekends     Family History   Problem Relation Age of Onset    Cerebrovascular Disease Maternal Grandfather     Genitourinary Problems Mother     Hypertension Mother     Cancer Father         skin    Emphysema Maternal Grandmother     Coronary Artery Disease Paternal Grandmother 60        triple bypass,  at 84    Coronary Artery Disease Paternal Grandfather 50        had triple bypass,  at 82    C.A.D. Paternal Uncle     Hypertension Paternal Uncle     Hypertension Maternal Aunt          Current Outpatient Medications   Medication Sig Dispense Refill    NP THYROID 60 MG tablet Take 60 mg by mouth daily.       No Known Allergies      Review of Systems  Constitutional, HEENT, cardiovascular, pulmonary, GI, , musculoskeletal, neuro, skin, endocrine and psych systems are negative, except as otherwise noted.     Objective    Exam  /86   Pulse 97   Temp 98.3  F (36.8  C) (Temporal)   Resp 18   Ht 1.885 m (6' 2.21\")   Wt 130 kg (286 lb 8 oz)   SpO2 99%   BMI 36.57 kg/m     Estimated body mass index is 36.57 kg/m  as calculated from the following:    Height as of this encounter: 1.885 m (6' 2.21\").    Weight as of this encounter: 130 kg (286 lb 8 oz).    Physical Exam  GENERAL: alert and no distress  EYES: Eyes grossly normal to inspection, PERRL and conjunctivae and sclerae normal  HENT: ear canals and TM's normal, nose and mouth without ulcers or lesions  NECK: no adenopathy, no asymmetry, masses, or scars  RESP: lungs clear to auscultation - no rales, rhonchi or wheezes  CV: regular " rate and rhythm, normal S1 S2, no S3 or S4, no murmur, click or rub, no peripheral edema  ABDOMEN: soft, nontender, no hepatosplenomegaly, no masses and bowel sounds normal  MS: no gross musculoskeletal defects noted, no edema  SKIN: no suspicious lesions or rashes, left shoulder there is a brown, raised, rough 3 mm oval shaped lesion. (Consistent with Seborrheic keratosis)  NEURO: Normal strength and tone, mentation intact and speech normal  PSYCH: mentation appears normal, affect normal/bright      Signed Electronically by: Barbara Garcia PA-C

## 2025-05-12 NOTE — TELEPHONE ENCOUNTER
tirzepatide-Weight Management (ZEPBOUND) 2.5 MG/0.5ML prefilled pen     Prior Authorization Retail Medication Request    Medication/Dose: tirzepatide-Weight Management (ZEPBOUND) 2.5 MG/0.5ML prefilled pen   Diagnosis and ICD code (if different than what is on RX):    New/renewal/insurance change PA/secondary ins. PA:  Previously Tried and Failed:    Rationale:      Insurance   Primary:   Insurance ID:      Secondary (if applicable):  Insurance ID:      Pharmacy Information (if different than what is on RX)  Name:    Phone:    Fax:    Clinic Information  Preferred routing pool for dept communication:

## 2025-05-15 NOTE — TELEPHONE ENCOUNTER
Note: Due to record-high volumes, our turn-around time is taking longer than usual . We are currently 3  business days behind in the pools.   We are working diligently to submit all requests in a timely manner and in the order they are received. Please only flag TRUE URGENT requests as high priority to the pool at this time.   If you have questions on status of PA's,  please send a note/message in the active PA encounter and send back to the [facility] PA pool [[id number]].    If you have questions about the turn-around time or about our process, please reach out to our supervisor Nessa Saldaña.   Thank you!   RPPA (Retail Pharmacy Prior Authorization) team      Retail Pharmacy Prior Authorization Team   Phone: [phone]    PA Initiation    Medication: ZEPBOUND 2.5 MG/0.5ML SC SOAJ  Insurance Company: [employer] ([employer]) - Phone [phone] Fax [phone]  Pharmacy Filling the Rx: [id number] [place] [address]  Filling Pharmacy Phone: [phone]  Filling Pharmacy Fax:    Start Date: 5/15/2025    Started PA on CMM and a response of This medication may be excluded from the patient's benefit. For more information, please reach out to [employer] directly. Called and spoke with Jame, she shows that the medication is completely excluded, does not matter that the DX is DEDRA.  There is not an option to complete a PA.